# Patient Record
Sex: FEMALE | Race: WHITE
[De-identification: names, ages, dates, MRNs, and addresses within clinical notes are randomized per-mention and may not be internally consistent; named-entity substitution may affect disease eponyms.]

---

## 2019-09-12 ENCOUNTER — HOSPITAL ENCOUNTER (OUTPATIENT)
Dept: HOSPITAL 92 - BICULT | Age: 19
Discharge: HOME | End: 2019-09-12
Attending: ADVANCED PRACTICE MIDWIFE
Payer: COMMERCIAL

## 2019-09-12 DIAGNOSIS — R31.9: Primary | ICD-10-CM

## 2019-09-12 DIAGNOSIS — N13.30: ICD-10-CM

## 2019-09-12 PROCEDURE — 76770 US EXAM ABDO BACK WALL COMP: CPT

## 2019-09-12 NOTE — ULT
Bilateral renal ultrasound



CLINICAL INDICATION:

Patient with right flank pain for one week and hematuria. Patient is 6 1/2 months pregnant.



COMPARISON:

None



FINDINGS:



Right kidney: There is moderate right hydronephrosis of uncertain etiology based on this examination.
 No renal mass or renal calculus is identified on the right.The right kidney measures 10.4 cm x 6.1

cm.



Left kidney: There is no evidence of a renal mass, renal calculus, or hydronephrosis. The left kidney
 measures 10.8 cm x 4.9 cm.



Urinary bladder: Partially distended and grossly normal in appearance. The left ureteral jet is visua
lized. Ultrasound technician questions trace right ureteral jet visualized, but this is difficult

to definitely discern on this exam.



IMPRESSION:



1. Moderate right hydronephrosis, and the degree of hydronephrosis is more than typically expected fo
r an intrauterine gestation. The exact etiology is uncertain.

2. Normal-appearing left kidney without hydronephrosis.

3. Findings discussed with Dr. Khan on 9/12/2019 at 1549 hours.



Reported By: Ezequiel Machado 

Electronically Signed:  9/12/2019 3:52 PM

## 2019-09-29 ENCOUNTER — HOSPITAL ENCOUNTER (INPATIENT)
Dept: HOSPITAL 92 - ERS | Age: 19
LOS: 1 days | Discharge: HOME | DRG: 776 | End: 2019-09-30
Attending: OBSTETRICS & GYNECOLOGY | Admitting: OBSTETRICS & GYNECOLOGY
Payer: COMMERCIAL

## 2019-09-29 VITALS — BODY MASS INDEX: 25.3 KG/M2

## 2019-09-29 DIAGNOSIS — N36.8: ICD-10-CM

## 2019-09-29 DIAGNOSIS — N13.30: ICD-10-CM

## 2019-09-29 DIAGNOSIS — Z3A.28: ICD-10-CM

## 2019-09-29 DIAGNOSIS — O99.89: Primary | ICD-10-CM

## 2019-09-29 LAB
ALBUMIN SERPL BCG-MCNC: 3.7 G/DL (ref 3.5–5)
ALP SERPL-CCNC: 100 U/L (ref 40–100)
ALT SERPL W P-5'-P-CCNC: 11 U/L (ref 8–55)
ANION GAP SERPL CALC-SCNC: 11 MMOL/L (ref 10–20)
APTT PPP: 27.2 SEC (ref 22.9–36.1)
AST SERPL-CCNC: 11 U/L (ref 5–34)
BASOPHILS # BLD AUTO: 0 THOU/UL (ref 0–0.2)
BASOPHILS NFR BLD AUTO: 0.1 % (ref 0–1)
BILIRUB SERPL-MCNC: 0.2 MG/DL (ref 0.2–1.2)
BUN SERPL-MCNC: 7 MG/DL (ref 7–18.7)
CALCIUM SERPL-MCNC: 8.6 MG/DL (ref 7.8–10.44)
CHLORIDE SERPL-SCNC: 105 MMOL/L (ref 98–107)
CO2 SERPL-SCNC: 21 MMOL/L (ref 22–29)
CREAT CL PREDICTED SERPL C-G-VRATE: 0 ML/MIN (ref 70–130)
EOSINOPHIL # BLD AUTO: 0.3 THOU/UL (ref 0–0.7)
EOSINOPHIL NFR BLD AUTO: 1.8 % (ref 0–10)
GLOBULIN SER CALC-MCNC: 3 G/DL (ref 2.4–3.5)
GLUCOSE SERPL-MCNC: 78 MG/DL (ref 70–105)
HGB BLD-MCNC: 11.1 G/DL (ref 12–16)
INR PPP: 1
LIPASE SERPL-CCNC: 23 U/L (ref 8–78)
LYMPHOCYTES # BLD: 2.7 THOU/UL (ref 1.2–3.4)
LYMPHOCYTES NFR BLD AUTO: 18.7 % (ref 28–48)
MCH RBC QN AUTO: 30.1 PG (ref 25–35)
MCV RBC AUTO: 89.7 FL (ref 78–98)
MONOCYTES # BLD AUTO: 1.1 THOU/UL (ref 0.11–0.59)
MONOCYTES NFR BLD AUTO: 7.4 % (ref 0–4)
NEUTROPHILS # BLD AUTO: 10.3 THOU/UL (ref 1.4–6.5)
NEUTROPHILS NFR BLD AUTO: 71.9 % (ref 31–61)
PLATELET # BLD AUTO: 243 THOU/UL (ref 130–400)
POTASSIUM SERPL-SCNC: 3.7 MMOL/L (ref 3.5–5.1)
PROT UR STRIP.AUTO-MCNC: 30 MG/DL
PROTHROMBIN TIME: 13.3 SEC (ref 12–14.7)
RBC # BLD AUTO: 3.69 MILL/UL (ref 4–5.2)
RBC UR QL AUTO: (no result) HPF (ref 0–3)
SODIUM SERPL-SCNC: 133 MMOL/L (ref 136–145)
WBC # BLD AUTO: 14.3 THOU/UL (ref 4.8–10.8)
WBC UR QL AUTO: (no result) HPF (ref 0–3)

## 2019-09-29 PROCEDURE — 36415 COLL VENOUS BLD VENIPUNCTURE: CPT

## 2019-09-29 PROCEDURE — 96361 HYDRATE IV INFUSION ADD-ON: CPT

## 2019-09-29 PROCEDURE — 85730 THROMBOPLASTIN TIME PARTIAL: CPT

## 2019-09-29 PROCEDURE — 50432 PLMT NEPHROSTOMY CATHETER: CPT

## 2019-09-29 PROCEDURE — 85025 COMPLETE CBC W/AUTO DIFF WBC: CPT

## 2019-09-29 PROCEDURE — 81003 URINALYSIS AUTO W/O SCOPE: CPT

## 2019-09-29 PROCEDURE — 85610 PROTHROMBIN TIME: CPT

## 2019-09-29 PROCEDURE — 96374 THER/PROPH/DIAG INJ IV PUSH: CPT

## 2019-09-29 PROCEDURE — 86901 BLOOD TYPING SEROLOGIC RH(D): CPT

## 2019-09-29 PROCEDURE — 86900 BLOOD TYPING SEROLOGIC ABO: CPT

## 2019-09-29 PROCEDURE — 81015 MICROSCOPIC EXAM OF URINE: CPT

## 2019-09-29 PROCEDURE — 83690 ASSAY OF LIPASE: CPT

## 2019-09-29 PROCEDURE — 87086 URINE CULTURE/COLONY COUNT: CPT

## 2019-09-29 PROCEDURE — 80053 COMPREHEN METABOLIC PANEL: CPT

## 2019-09-29 PROCEDURE — 50430 NJX PX NFROSGRM &/URTRGRM: CPT

## 2019-09-29 NOTE — HP
TIME OF EVALUATION:  2145 hours until 2200 hours.



LOCATION:  ER.



BED:  7.



REASON FOR EVALUATION:  

1. The patient with known right hydronephrosis due to ureteral obstruction from

dextrorotation of the uterus. 

2. The patient of Lauryn Erin.



HISTORY OF PRESENT ILLNESS:  This is a 19-year-old  G2, P1, at 28 weeks and

2 days with a stated EDC of 12/20.  She states that she was having some increase in

lower back discomfort, but denies any fever.  She saw Dr. Robert Askew with Urology

this last Thursday, who recommended a percutaneous nephrostomy tube.  She has had no

prenatal complications at this time.  She has good fetal movement.  She just took

her 1 hour glucose tolerance test earlier last week, but she does not have the

results of that. 



REVIEW OF SYSTEMS:  Complete review of systems was checked and is otherwise negative

unless specified in the HPI. 



PAST MEDICAL HISTORY:  Negative.



PAST SURGICAL HISTORY:  Includes;

1. A cheek biopsy (oral biopsy) as a child, which was benign.

2. She also had a history of a stomach "biopsy" at age 13, which was benign.



ALLERGIES:  NONE.



OB HISTORY:  She had a vaginal birth 18 months ago.



SOCIAL HISTORY:  Otherwise negative.



PHYSICAL EXAMINATION:

VITAL SIGNS:  Stable.  She is afebrile.  Pulse is in the 80s to 90s, blood pressure

is in the 100s/60s. 

GENERAL:  Clinically, she is in no acute distress. 

ABDOMEN:  Soft and nontender and size consistent with dates.  No evidence of

costovertebral angle tenderness by initial ED exam confirmed by me. 

PELVIC:  No evidence of vaginal bleeding grossly, although pelvic exam was deferred. 

FETAL MONITOR:  Fetal heart tones were checked by Doppler and were within normal

limits. 



LABORATORY ASSESSMENT:  The patient has a white blood cell count of 14.3, PT and PTT

that were normal, and a creatinine that was 0.53 by serum, AST and ALT were also

negative.  She did have a UA sent, which just shows some trace leukocyte esterase,

some small blood, and no urine bacteria seen. 



ASSESSMENT:  This is a multigravida at 28 weeks and 2 days.  The patient of Lauryn Khan with known right ureteral obstruction due to dextrorotation of the uterus.  No

evidence of pyelonephritis at this time.  I have notified Dr. Robert Askew via text

and have communicated with him.  He will see her tomorrow.  The plan is to do

Interventional Radiology with a percutaneous tube tomorrow.  I will keep her n.p.o. 



PLAN:  

1. Admit to the hospital for procedure tomorrow.

2. Urology to see patient tomorrow.

3. Interventional Radiology procedure sometime tomorrow.

4. No evidence of OB complication at this time.

5. No evidence of pyelonephritis at this time.







Job ID:  [id number]

## 2019-09-30 VITALS — SYSTOLIC BLOOD PRESSURE: 111 MMHG | TEMPERATURE: 98.3 F | DIASTOLIC BLOOD PRESSURE: 63 MMHG

## 2019-09-30 PROCEDURE — 0T9330Z DRAINAGE OF RIGHT KIDNEY PELVIS WITH DRAINAGE DEVICE, PERCUTANEOUS APPROACH: ICD-10-PCS | Performed by: RADIOLOGY

## 2019-09-30 NOTE — PDOC.EVN
Event Note





- Event Note


Event Note: 





HD2





28 weeks 3 days





I walked into the room to see her at 0545 but patient was asleep (did not feel 

it needed to wake her).





I asked the RN about her status:





Patient has been stable, and resting through the night





Vitals reviewed by Michele CLINE





No evidence PTL or pyelo





A/P: Right ureteral obstruction from uterine position, pending IR perc tube 

sometime today. Dr Copeland to see today.

## 2019-09-30 NOTE — SPC
EXAM:

SPC NEPHROS CATH NEW ACCESS



PROVIDED CLINICAL HISTORY:

Patient with intrauterine gestation and right renal colic. Patient has moderate right hydronephrosis 
with worsening pain. Percutaneous right nephrostomy tube was requested.



COMPARISON:

Renal ultrasound 9/12/2019.



FINDINGS:

The procedure including the risks and complications were explained to the patient, and informed conse
nt was obtained. Patient was placed on the fluoroscopic table in the left lateral decubitus

position as the patient was unable to be placed in a prone or oblique position due to intrauterine ge
station. Limited sonographic evaluation of the right kidney was performed again demonstrating

moderate hydronephrosis. An area was marked overlying a posterior calyx midportion right kidney. The 
area was meticulously prepped and draped in the usual sterile fashion.



The skin and subcutaneous tissues were infiltrated with 1% lidocaine for local anesthesia. Utilizing 
concurrent real time ultrasound guidance, the posterior calyx midportion right kidney was accessed

utilizing micropuncture technique. There was a return of clear urine. A 0.018 inch guidewire was plac
ed, but there was difficulty in placement of the guidewire utilizing sonography. As result, a small

amount of contrast was injected followed by fluoroscopy. The guidewire was coiled in the renal pelvis
. The needle was exchanged for a 5 Sinhala introducer sheath. A 0.035 inch Amplatz guidewire was

coiled within the renal pelvis.



Introducer sheath was exchanged for an 8 Sinhala tissue dilator followed by placement of an 8 Sinhala n
ephrostomy tube. The distal portion of the nephrostomy tube was positioned in the renal pelvis.

Final fluoroscopic image was obtained. The catheter was placed to gravity drainage and sutured in steph
ce utilizing 2-0 Ethilon suture material.



The patient tolerated the procedure well and without immediate complication. Cardiac Doppler was perf
ormed of the fetus before and after the procedure, and a fetal heart rate of 150 bpm was obtained

preprocedure as well as postprocedure.



Fluoroscopy:

Total fluoroscopy time-1.7 minutes

Total dose 14,685 mGy centimeter squared



IMPRESSION:



1. Moderate right hydronephrosis.

2. Technically successful right nephrostomy tube placement.



Reported By: Ezequiel Machado 

Electronically Signed:  9/30/2019 3:25 PM

## 2019-10-01 NOTE — DIS
DATE OF ADMISSION:  09/29/2019



DATE OF DISCHARGE:  09/30/2019



TIME OF SERVICE:  1630 hours.



ADMITTING DIAGNOSIS:  Right hydronephrosis of pregnancy.



IN-HOSPITAL PROCEDURES:  Right percutaneous nephrostomy.



SUMMARY OF HOSPITAL COURSE:  The patient is a 19-year-old, G2, P1, at 28 weeks and 3

days with EDC of 1220.  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

near-total right ureteral obstruction during pregnancy.  She presented with

symptomatology, and decision was made to proceed with percutaneous nephrostomy on

the right.  She underwent percutaneous nephrostomy by Dr. Ezequiel Machado on 09/30

without difficulty.  Postoperatively, the patient tolerated the pain well and was

tolerating p.o.  She will be discharged home on Macrodantin 50 p.o. at bedtime as

per Dr. Askew to prevent UTI as well as Norco #20.  She will have follow up with

both Dr. Askew and Lauryn Khan within the next 1 to 2 weeks.  The patient has

emergency room precautions. 







Job ID:  [id number]

## 2019-10-01 NOTE — CON
DATE OF CONSULTATION:  2019



CONSULTING PHYSICIAN:  Dr. Fuentes consulted Dr. Askew.



REASON FOR CONSULTATION:  Hydronephrosis.



HISTORY OF PRESENT ILLNESS:  Mrs. Garcia is a 19-year-old white female, currently

G2, P1, 28 weeks, who has a history of hydronephrosis on the right side.  This had

occurred previously with her last pregnancy and after she delivered, she underwent a

CT scan, which demonstrated no etiology of the hydronephrosis and that was most

likely secondary to uterine positioning.  The problem started again with an

ultrasound demonstrating a recurrent hydronephrosis on the right with some

discomfort and flank pain.  She had seen me as an outpatient and we had scheduled

her for an elective nephrostomy tube placement.  Unfortunately, she came in to the

emergency room last night with severe right flank pain with nausea and vomiting, but

no white count or fevers.  She had no evidence of urinary tract infection.  Due to

the vomiting and pain, she was admitted to the hospital and I have been asked to see

her, otherwise.  She states that she is feeling a little bit better after receiving

pain medication and IV fluids.  She has good fetal movements.  She has been admitted

by the obstetric service and she does not have any concerning findings from a

 or gestational standpoint. 



PAST MEDICAL HISTORY:  None.



PAST SURGICAL HISTORY:  

1. Cheek biopsy.

2. Stomach biopsy.



ALLERGIES:  NONE.



HOME MEDICATIONS:  Multivitamin.



FAMILY HISTORY:  Noncontributory.



SOCIAL HISTORY:  Negative for tobacco, alcohol, or illicit drugs.



REVIEW OF SYSTEMS:  A 12-point review of systems is reviewed and otherwise negative

other than what was commented on the HPI. 



PHYSICAL EXAMINATION:

VITAL SIGNS:  Stable.  The patient is afebrile. 

GENERAL:  No apparent distress, communicative and alert. 

CARDIOVASCULAR:  Regular rate and rhythm.  Normal S1 and S2.  Symmetric pulses. 

HEENT:  Normocephalic, atraumatic.  Pupils are symmetric and round.  Trachea,

midline.  Moist mucous membranes. 

CHEST:  No increased work of breathing.  Symmetric expansion. 

LUNGS:  Clear anteriorly. 

ABDOMEN:  Soft, nontender.  Gravid uterus.  No rebound or guarding. 

BACK:  Right CVA tenderness.  No left CVA tenderness. 

GENITOURINARY:  Deferred at this time. 

EXTREMITIES:  Trace edema.  No clubbing or cyanosis. 

MUSCULOSKELETAL:  No joint deformity or joint erythema noted.  Full range of motion. 

SKIN:  Warm and dry.  Good turgor.  No rashes or lesions. 

NEUROLOGIC:  Cranial nerves 2 through 12 are grossly intact.  No focal sensory or

motor deficits identified. 

PSYCHIATRIC:  Alert and oriented x3.  Appropriate mood and affect.



LABORATORY EVALUATION:  A full set of labs is in the "[employer] system, which I have

reviewed.  Of note, the patient's white count is 14.3 with a creatinine of 0.53.

Urinalysis demonstrates 7 to 10 rbc's, 4-6 wbc's, no bacteria, leukocyte esterase

trace, nitrite negative.  Urine culture collected at admission is no growth at 12

hours, although culture is proceeding. 



ASSESSMENT AND PLAN:  A 19-year-old white female with right-sided hydronephrosis,

most likely secondary to uterine positioning with now uncontrolled flank pain with

vomiting.  We had already planned for outpatient nephrostomy tube to deal with

chronic pain and prevent renal deterioration.  Now that she has been admitted with

symptoms, we will plan for a nephrostomy tube placement on this admission.  She is

already n.p.o.  She has already received a dose of Rocephin.  I have spoken with Dr. Ferrera, who is currently covering for Dr. Fuentes for the morning.  After she gets her

nephrostomy tube placed, she will need to start nitrofurantoin prophylaxis at 50 mg

once a day.  She can be discharged if she is stable after the procedure, and I will

follow up in an outpatient basis.  She will need to have a nephrostomy tube changed

at least once during the duration of the remainder of her pregnancy to avoid

encrustation secondary to hypercalciuria of pregnancy.  Once she has delivered, we

will plan for removal of nephrostomy tube with another CT scan to ensure that there

are no stones or other underlying etiologies.  If none are found, then again is most

likely due to uterine positioning. 







Job ID:  [id number]

## 2019-10-05 ENCOUNTER — HOSPITAL ENCOUNTER (EMERGENCY)
Dept: HOSPITAL 92 - ERS | Age: 19
Discharge: HOME | End: 2019-10-05
Payer: COMMERCIAL

## 2019-10-05 DIAGNOSIS — Z3A.29: ICD-10-CM

## 2019-10-05 DIAGNOSIS — T83.84XA: ICD-10-CM

## 2019-10-05 DIAGNOSIS — O9A.213: Primary | ICD-10-CM

## 2019-10-05 LAB
ALBUMIN SERPL BCG-MCNC: 3.9 G/DL (ref 3.5–5)
ALP SERPL-CCNC: 116 U/L (ref 40–100)
ALT SERPL W P-5'-P-CCNC: 12 U/L (ref 8–55)
ANION GAP SERPL CALC-SCNC: 12 MMOL/L (ref 10–20)
AST SERPL-CCNC: 13 U/L (ref 5–34)
BACTERIA UR QL AUTO: (no result) HPF
BASOPHILS # BLD AUTO: 0 THOU/UL (ref 0–0.2)
BASOPHILS NFR BLD AUTO: 0.3 % (ref 0–1)
BILIRUB SERPL-MCNC: (no result) MG/DL (ref 0.2–1.2)
BUN SERPL-MCNC: 7 MG/DL (ref 7–18.7)
CALCIUM SERPL-MCNC: 9.6 MG/DL (ref 7.8–10.44)
CHLORIDE SERPL-SCNC: 104 MMOL/L (ref 98–107)
CO2 SERPL-SCNC: 24 MMOL/L (ref 22–29)
CREAT CL PREDICTED SERPL C-G-VRATE: 0 ML/MIN (ref 70–130)
EOSINOPHIL # BLD AUTO: 0.2 THOU/UL (ref 0–0.7)
EOSINOPHIL NFR BLD AUTO: 1.2 % (ref 0–10)
GLOBULIN SER CALC-MCNC: 3.4 G/DL (ref 2.4–3.5)
GLUCOSE SERPL-MCNC: 72 MG/DL (ref 70–105)
HGB BLD-MCNC: 12 G/DL (ref 12–16)
LEUKOCYTE ESTERASE UR QL STRIP.AUTO: 250 LEU/UL
LYMPHOCYTES # BLD: 2.2 THOU/UL (ref 1.2–3.4)
LYMPHOCYTES NFR BLD AUTO: 14.4 % (ref 28–48)
MCH RBC QN AUTO: 30.2 PG (ref 25–35)
MCV RBC AUTO: 89.6 FL (ref 78–98)
MONOCYTES # BLD AUTO: 1.1 THOU/UL (ref 0.11–0.59)
MONOCYTES NFR BLD AUTO: 7.5 % (ref 0–4)
NEUTROPHILS # BLD AUTO: 11.5 THOU/UL (ref 1.4–6.5)
NEUTROPHILS NFR BLD AUTO: 76.7 % (ref 31–61)
PLATELET # BLD AUTO: 251 THOU/UL (ref 130–400)
POTASSIUM SERPL-SCNC: 3.8 MMOL/L (ref 3.5–5.1)
PROT UR STRIP.AUTO-MCNC: 30 MG/DL
RBC # BLD AUTO: 3.96 MILL/UL (ref 4–5.2)
SODIUM SERPL-SCNC: 136 MMOL/L (ref 136–145)
WBC # BLD AUTO: 15 THOU/UL (ref 4.8–10.8)
WBC UR QL AUTO: (no result) HPF (ref 0–3)

## 2019-10-05 PROCEDURE — 76770 US EXAM ABDO BACK WALL COMP: CPT

## 2019-10-05 PROCEDURE — 80053 COMPREHEN METABOLIC PANEL: CPT

## 2019-10-05 PROCEDURE — 83605 ASSAY OF LACTIC ACID: CPT

## 2019-10-05 PROCEDURE — 87086 URINE CULTURE/COLONY COUNT: CPT

## 2019-10-05 PROCEDURE — 81015 MICROSCOPIC EXAM OF URINE: CPT

## 2019-10-05 PROCEDURE — 85025 COMPLETE CBC W/AUTO DIFF WBC: CPT

## 2019-10-05 PROCEDURE — 36415 COLL VENOUS BLD VENIPUNCTURE: CPT

## 2019-10-05 PROCEDURE — 87186 SC STD MICRODIL/AGAR DIL: CPT

## 2019-10-05 PROCEDURE — 81003 URINALYSIS AUTO W/O SCOPE: CPT

## 2019-10-05 PROCEDURE — 87077 CULTURE AEROBIC IDENTIFY: CPT

## 2019-10-05 NOTE — ULT
RENAL ULTRASOUND:

10/5/19

 

INDICATION:

Nephrostomy tube pain.

 

Reference is made to 9/12/19 exam.

 

FINDINGS: 

Evaluation of each kidney reveals no evidence of hydronephrosis. There has been resolution of prior r
ight side hydronephrosis. No perinephric fluid of significance is seen. 

 

There is documentation of fetal cardiac activity at 157 beats per minute. 

 

IMPRESSION: 

No overt hydronephrosis of either kidney. 

 

POS: NWK

## 2019-10-09 ENCOUNTER — HOSPITAL ENCOUNTER (OUTPATIENT)
Dept: HOSPITAL 92 - SPEC | Age: 19
Discharge: HOME | End: 2019-10-09
Attending: UROLOGY
Payer: COMMERCIAL

## 2019-10-09 VITALS — SYSTOLIC BLOOD PRESSURE: 101 MMHG | DIASTOLIC BLOOD PRESSURE: 71 MMHG | TEMPERATURE: 97.7 F

## 2019-10-09 DIAGNOSIS — Z3A.30: ICD-10-CM

## 2019-10-09 DIAGNOSIS — O99.89: Primary | ICD-10-CM

## 2019-10-09 DIAGNOSIS — N13.30: ICD-10-CM

## 2019-10-09 PROCEDURE — 50431 NJX PX NFROSGRM &/URTRGRM: CPT

## 2019-10-09 NOTE — SPC
Exam: Nephrostomy tube check



HISTORY: Right-sided hydronephrosis. Patient is 30 weeks pregnant

Exposure 1.2 minutes; 8586 mg/sq cm



FINDINGS: Successful right nephrostomy tube check. After using Amplatz wire twice, the nephrostomy tu
be did spontaneously draining urine. Contrast was administered and demonstrated that the pigtail is

within the renal pelvis. There is dilatation of the calyx and ureter.



IMPRESSION: Appropriate positioning of a right nephrostomy. Nephrostomy tube is patent.



Reported By: Annette Escobedo 

Electronically Signed:  10/9/2019 2:10 PM

## 2019-10-10 ENCOUNTER — HOSPITAL ENCOUNTER (INPATIENT)
Dept: HOSPITAL 92 - ERS | Age: 19
LOS: 6 days | Discharge: HOME HEALTH | DRG: 832 | End: 2019-10-16
Attending: OBSTETRICS & GYNECOLOGY | Admitting: OBSTETRICS & GYNECOLOGY
Payer: COMMERCIAL

## 2019-10-10 VITALS — BODY MASS INDEX: 25.4 KG/M2

## 2019-10-10 DIAGNOSIS — N13.6: ICD-10-CM

## 2019-10-10 DIAGNOSIS — O23.03: ICD-10-CM

## 2019-10-10 DIAGNOSIS — O98.813: Primary | ICD-10-CM

## 2019-10-10 DIAGNOSIS — T83.512A: ICD-10-CM

## 2019-10-10 DIAGNOSIS — R78.81: ICD-10-CM

## 2019-10-10 DIAGNOSIS — Z3A.30: ICD-10-CM

## 2019-10-10 LAB
ALBUMIN SERPL BCG-MCNC: 3.8 G/DL (ref 3.5–5)
ALP SERPL-CCNC: 179 U/L (ref 40–100)
ALT SERPL W P-5'-P-CCNC: 12 U/L (ref 8–55)
ANION GAP SERPL CALC-SCNC: 12 MMOL/L (ref 10–20)
AST SERPL-CCNC: 13 U/L (ref 5–34)
BACTERIA UR QL AUTO: (no result) HPF
BASOPHILS # BLD AUTO: 0 THOU/UL (ref 0–0.2)
BASOPHILS NFR BLD AUTO: 0.2 % (ref 0–1)
BILIRUB SERPL-MCNC: 0.4 MG/DL (ref 0.2–1.2)
BUN SERPL-MCNC: 7 MG/DL (ref 7–18.7)
CALCIUM SERPL-MCNC: 9.2 MG/DL (ref 7.8–10.44)
CHLORIDE SERPL-SCNC: 101 MMOL/L (ref 98–107)
CO2 SERPL-SCNC: 22 MMOL/L (ref 22–29)
CREAT CL PREDICTED SERPL C-G-VRATE: 0 ML/MIN (ref 70–130)
EOSINOPHIL # BLD AUTO: 0.1 THOU/UL (ref 0–0.7)
EOSINOPHIL NFR BLD AUTO: 0.4 % (ref 0–10)
GLOBULIN SER CALC-MCNC: 3.7 G/DL (ref 2.4–3.5)
GLUCOSE SERPL-MCNC: 91 MG/DL (ref 70–105)
HGB BLD-MCNC: 11.6 G/DL (ref 12–16)
LEUKOCYTE ESTERASE UR QL STRIP.AUTO: 500 LEU/UL
LYMPHOCYTES # BLD: 1.5 THOU/UL (ref 1.2–3.4)
LYMPHOCYTES NFR BLD AUTO: 10.4 % (ref 28–48)
MCH RBC QN AUTO: 29.6 PG (ref 25–35)
MCV RBC AUTO: 86 FL (ref 78–98)
MONOCYTES # BLD AUTO: 1.4 THOU/UL (ref 0.11–0.59)
MONOCYTES NFR BLD AUTO: 9.7 % (ref 0–4)
NEUTROPHILS # BLD AUTO: 11.1 THOU/UL (ref 1.4–6.5)
NEUTROPHILS NFR BLD AUTO: 79.4 % (ref 31–61)
PLATELET # BLD AUTO: 239 THOU/UL (ref 130–400)
POTASSIUM SERPL-SCNC: 3.3 MMOL/L (ref 3.5–5.1)
PROT UR STRIP.AUTO-MCNC: 70 MG/DL
RBC # BLD AUTO: 3.92 MILL/UL (ref 4–5.2)
RBC UR QL AUTO: (no result) HPF (ref 0–3)
SODIUM SERPL-SCNC: 132 MMOL/L (ref 136–145)
WBC # BLD AUTO: 14 THOU/UL (ref 4.8–10.8)
WBC UR QL AUTO: (no result) HPF (ref 0–3)

## 2019-10-10 PROCEDURE — 59025 FETAL NON-STRESS TEST: CPT

## 2019-10-10 PROCEDURE — 96375 TX/PRO/DX INJ NEW DRUG ADDON: CPT

## 2019-10-10 PROCEDURE — 87149 DNA/RNA DIRECT PROBE: CPT

## 2019-10-10 PROCEDURE — 36415 COLL VENOUS BLD VENIPUNCTURE: CPT

## 2019-10-10 PROCEDURE — 80048 BASIC METABOLIC PNL TOTAL CA: CPT

## 2019-10-10 PROCEDURE — 85025 COMPLETE CBC W/AUTO DIFF WBC: CPT

## 2019-10-10 PROCEDURE — 87040 BLOOD CULTURE FOR BACTERIA: CPT

## 2019-10-10 PROCEDURE — C1729 CATH, DRAINAGE: HCPCS

## 2019-10-10 PROCEDURE — 96361 HYDRATE IV INFUSION ADD-ON: CPT

## 2019-10-10 PROCEDURE — 87086 URINE CULTURE/COLONY COUNT: CPT

## 2019-10-10 PROCEDURE — 76770 US EXAM ABDO BACK WALL COMP: CPT

## 2019-10-10 PROCEDURE — 80053 COMPREHEN METABOLIC PANEL: CPT

## 2019-10-10 PROCEDURE — 81015 MICROSCOPIC EXAM OF URINE: CPT

## 2019-10-10 PROCEDURE — 96365 THER/PROPH/DIAG IV INF INIT: CPT

## 2019-10-10 PROCEDURE — 81003 URINALYSIS AUTO W/O SCOPE: CPT

## 2019-10-10 PROCEDURE — 87186 SC STD MICRODIL/AGAR DIL: CPT

## 2019-10-10 PROCEDURE — 87077 CULTURE AEROBIC IDENTIFY: CPT

## 2019-10-10 PROCEDURE — 75984 XRAY CONTROL CATHETER CHANGE: CPT

## 2019-10-10 PROCEDURE — C1769 GUIDE WIRE: HCPCS

## 2019-10-10 PROCEDURE — 50436 DILAT XST TRC NDURLGC PX: CPT

## 2019-10-10 PROCEDURE — 83605 ASSAY OF LACTIC ACID: CPT

## 2019-10-10 PROCEDURE — 87804 INFLUENZA ASSAY W/OPTIC: CPT

## 2019-10-10 PROCEDURE — 50431 NJX PX NFROSGRM &/URTRGRM: CPT

## 2019-10-10 PROCEDURE — 93005 ELECTROCARDIOGRAM TRACING: CPT

## 2019-10-11 RX ADMIN — MEROPENEM AND SODIUM CHLORIDE SCH MLS: 1 INJECTION, SOLUTION INTRAVENOUS at 13:35

## 2019-10-11 RX ADMIN — CEFTRIAXONE SCH MLS: 1 INJECTION, POWDER, FOR SOLUTION INTRAMUSCULAR; INTRAVENOUS at 21:01

## 2019-10-11 RX ADMIN — MEROPENEM AND SODIUM CHLORIDE SCH MLS: 1 INJECTION, SOLUTION INTRAVENOUS at 21:50

## 2019-10-11 NOTE — HP
TIME OF SERVICE:  636.



REASON FOR ADMISSION:  Pyelonephritis, right-sided with sepsis at 30 weeks

gestation, status post right percutaneous nephrostomy with multidrug resistant

Pseudomonas. 



HISTORY OF PRESENT ILLNESS:  Ms. Garcia is well known to the service.  She is a

19-year-old, G2, P1, EDC , at 30 weeks gestation, who had percutaneous

nephrostomies placed approximately a week ago.  She was put on Macrodantin

suppression.  However, urine has grown Pseudomonas on 10/05 that was sensitive to

cephalosporins as well as meropenem.  She presented to the emergency room with fever

and tachycardia and is admitted for care by the OB service because of her pregnancy

with consultation of Dr. Askew, Urology. 



OB/GYN HISTORY:  Prior .  During that pregnancy, she had right hydronephrosis and

ureteral obstruction from dextrorotation of the uterus as well. 



PAST MEDICAL HISTORY:  Negative.



PAST SURGICAL HISTORY:  Percutaneous nephrostomy, cheek biopsy, stomach biopsy.



ALLERGIES:  DENIES.



MEDICATIONS:  Prenatal vitamins, Macrobid.



SOCIAL HISTORY:  Denies tobacco, alcohol, or IV drug use.



FAMILY HISTORY:  Noncontributory.



REVIEW OF SYSTEMS:  Noncontributory .



PHYSICAL EXAMINATION:

VITAL SIGNS:  On presentation to the emergency room, the patient's temperature was

100.4, current temperature is 99.0, pulse 105, blood pressure 104/58. 

HEENT:  Within normal limits. 

LUNGS:  Clear to auscultation bilaterally. 

HEART:  Regular rate and rhythm. 

ABDOMEN:  Soft and nontender.  Fundal height 31.  FHT is 160s.  Right percutaneous

nephrostomy is in place and draining well with clear urine. 

EXTREMITIES:  No clubbing, cyanosis, or edema.



LABORATORY DATA:  The patient's white count 14.0, which is actually consistent with

previous presentations.  Neutrophils are 79%, hematocrit is 33%, normal platelet

count.  Base met reveals slightly hyponatremic at 132, potassium 3.3.  Urinalysis

was turbid with 500+ leukocyte esterase and 7 to 10 WBCs per high-power field with

2+ bacteria. 



IMPRESSION:  Pseudomonas urosepsis at 30 weeks gestation, status post right

percutaneous nephrostomy. 



PLAN:  Admission, consultation with Dr. Askew, meropenem and Rocephin.  We will

correct hypokalemia. 







Job ID:  [id number]

## 2019-10-11 NOTE — CON
DATE OF CONSULTATION:  10/11/2019



CONSULTING:  Dr. Ferrera.



CONSULTED:  Dr. Askew.



REASON FOR CONSULTATION:  Complicated urinary tract infection in pregnancy.



HISTORY OF PRESENT ILLNESS:  Ms. Garcia is a 19-year-old white female, who is well

known to me from a previous admission and I am currently seeing her for this

pregnancy secondary to hydronephrosis.  She is currently G2, P1, at 30 weeks'

gestation, who had development of right-sided hydronephrosis and microhematuria.

This had occurred almost identically on her prior pregnancy with no stone

encountered.  Due to symptoms of nausea, vomiting, and persistent pain, she had a

nephrostomy tube placed on her right side.  She had issues with nephrostomy clogging

and recently had it unclogged in Interventional Radiology.  She had seen us in our

office to learn how to flush her nephrostomy tube.  She just did this yesterday and

then began having temperatures up to 101 today, so she came into the hospital where

she was admitted.  A urine culture was taken from the nephrostomy tube, which is

currently growing Pseudomonas, but susceptibilities are not yet available.  She is

currently being treated with meropenem and Rocephin and is stating that she is

feeling much better.  She is no longer having temperatures and states that her pain

is gone down significantly.  Her heart rate is also better. 



PAST MEDICAL HISTORY:  Prior hydronephrosis with pregnancy.



PAST SURGICAL HISTORY:  

1. Nephrostomy tube.

2. Cheek biopsy.

3. Stomach biopsy.



ALLERGIES:  NONE.



CURRENT MEDICATIONS:  

1. Prophylactic Macrobid once a day.

2. Prenatal vitamins.



SOCIAL HISTORY:  The patient denies tobacco, alcohol, or IV drug use.



FAMILY HISTORY:  Noncontributory.



REVIEW OF SYSTEMS:  A 12-point review of system was reviewed and negative otherwise

other than what was commented on the HPI. 



PHYSICAL EXAMINATION:

VITAL SIGNS:  Temperature 98.4, pulse 92, respirations 16, blood pressure 96/54, and

saturation 98% on room air. 

GENERAL:  No apparent distress.  Communicative and alert. 

CARDIOVASCULAR:  Regular rate and rhythm.  Normal S1 and S2.  Symmetric pulses. 

HEENT:  Normocephalic and atraumatic.  Pupils are symmetric and round.  Trachea

midline.  Moist mucous membranes. 

CHEST:  No increased work of breathing.  Symmetric expansion. 

LUNGS:  Clear anteriorly. 

ABDOMEN:  Gravid uterus.  Normal bowel sounds.  No rebound tenderness or guarding.

Soft, nontender, and nondistended. 

:  Exam is deferred at this time. 

BACK:  Nephrostomy tube currently in good location, draining clear yellow urine. 

EXTREMITIES:  Trace edema bilaterally.  No clubbing or cyanosis. 

SKIN:  Warm and dry.  No rashes or lesions.  Good turgor.  LYMPH NODES:  There is no

cervical, supraclavicular, or inguinal lymphadenopathy. 

PSYCHIATRIC:  Alert and oriented x3.  Appropriate mood and affect. 

NEUROLOGIC:  Cranial nerves 2 through 12 grossly intact.  No focal or sensory motor

deficits identified. 



LABORATORY EVALUATION:  The full set of labs are in the [employer] system, which I

have reviewed.  Of note, white count is up to 14,000, hemoglobin of 11.6.

Creatinine of 0.60.  Urinalysis demonstrates 2+ bacteria, 7 to 10 white cells, 4 to

6 red cells, and urine culture again is growing Pseudomonas.  Susceptibilities to

follow.  Blood cultures currently are negative to date. 



ASSESSMENT AND PLAN:  A 19-year-old white female with Pseudomonas urinary tract

infection.  Macrobid does not cover against Pseudomonas, which is likely why this

pathogen has started to cause a problem.  Unfortunately, the best oral antibiotic

agent for Pseudomonas is Cipro, which she cannot take currently while pregnant.  I

would recommend continuation of IV antibiotics as she is currently on until

susceptibilities are back.  If she has susceptibilities to Rocephin, she can be

discharged after the weekend and we can continue to give her ceftriaxone injections

in the office in Urology Monday through Friday to complete 7 days for complicated

urinary tract infection.  However, if there are no susceptibilities to Rocephin or

if she must remain on meropenem or other IV antibiotic, then she will either need to

consider 7-day hospital stay with IV antibiotics or insertion of a PICC line with

home IV antibiotics.  We will continue to follow along and make any recommendations

as necessary.  I will plan to see her in my office next week, after which point we

will schedule her for a routine nephrostomy tube change coming up in approximately 1

week or so. 







Job ID:  [id number]

## 2019-10-12 LAB
ANION GAP SERPL CALC-SCNC: 8 MMOL/L (ref 10–20)
BASOPHILS # BLD AUTO: 0 THOU/UL (ref 0–0.2)
BASOPHILS NFR BLD AUTO: 0.3 % (ref 0–1)
BUN SERPL-MCNC: 5 MG/DL (ref 7–18.7)
CALCIUM SERPL-MCNC: 8 MG/DL (ref 7.8–10.44)
CHLORIDE SERPL-SCNC: 109 MMOL/L (ref 98–107)
CO2 SERPL-SCNC: 21 MMOL/L (ref 22–29)
CREAT CL PREDICTED SERPL C-G-VRATE: 205 ML/MIN (ref 70–130)
EOSINOPHIL # BLD AUTO: 0.2 THOU/UL (ref 0–0.7)
EOSINOPHIL NFR BLD AUTO: 1.5 % (ref 0–10)
GLUCOSE SERPL-MCNC: 83 MG/DL (ref 70–105)
HGB BLD-MCNC: 9.7 G/DL (ref 12–16)
LYMPHOCYTES # BLD: 2.2 THOU/UL (ref 1.2–3.4)
LYMPHOCYTES NFR BLD AUTO: 20.9 % (ref 28–48)
MCH RBC QN AUTO: 30.3 PG (ref 25–35)
MCV RBC AUTO: 89.7 FL (ref 78–98)
MONOCYTES # BLD AUTO: 1.1 THOU/UL (ref 0.11–0.59)
MONOCYTES NFR BLD AUTO: 10.5 % (ref 0–4)
NEUTROPHILS # BLD AUTO: 7.2 THOU/UL (ref 1.4–6.5)
NEUTROPHILS NFR BLD AUTO: 66.8 % (ref 31–61)
PLATELET # BLD AUTO: 217 THOU/UL (ref 130–400)
POTASSIUM SERPL-SCNC: 3.9 MMOL/L (ref 3.5–5.1)
RBC # BLD AUTO: 3.21 MILL/UL (ref 4–5.2)
SODIUM SERPL-SCNC: 134 MMOL/L (ref 136–145)
WBC # BLD AUTO: 10.7 THOU/UL (ref 4.8–10.8)

## 2019-10-12 RX ADMIN — CEFTRIAXONE SCH MLS: 1 INJECTION, POWDER, FOR SOLUTION INTRAMUSCULAR; INTRAVENOUS at 20:35

## 2019-10-12 RX ADMIN — MEROPENEM AND SODIUM CHLORIDE SCH MLS: 1 INJECTION, SOLUTION INTRAVENOUS at 21:13

## 2019-10-12 RX ADMIN — MEROPENEM AND SODIUM CHLORIDE SCH MLS: 1 INJECTION, SOLUTION INTRAVENOUS at 06:33

## 2019-10-12 RX ADMIN — MEROPENEM AND SODIUM CHLORIDE SCH MLS: 1 INJECTION, SOLUTION INTRAVENOUS at 13:47

## 2019-10-12 NOTE — PRG
DATE OF SERVICE:  10/12/2019



SUBJECTIVE:  The patient is a 19-year-old female with an intrauterine pregnancy at

30 weeks and 1 day, admitted to the hospital for complications of a right

nephrostomy tube, percutaneous nephrostomy tube that she had placed for obstruction

from the pregnancy.  The patient had not been feeling well, had fever, and placed on

meropenem and Rocephin due to urine cultures positive for Pseudomonas.  The patient

since admission to the hospital, has had no recurrent febrile episodes.  Temperature

over the last 24 hours has maxed out at 99 degrees. 



OBJECTIVE:  VITAL SIGNS:  Current temperature is 97.9, blood pressure 99/58, pulse

is 75, respiratory rate of 20. 

GENERAL:  She appears to be in no acute distress.  She is alert and oriented,

cooperative, and pleasant to interact with. 

BACK:  She has no CVA tenderness. 

ABDOMEN:  No abdominal tenderness. 

EXTREMITIES:  Nontender, nonedematous.



LABORATORY DATA:  Urine cultures positive for Pseudomonas.  Blood cultures positive

for Pseudomonas pseudo aeruginosa.  Awaiting on sensitivities from these cultures.

I did run into MsGeorge Lauryn Erin, her primary OB, who reported a previous culture

recently finalized and was positive for the same sensitivity to cephalosporins,

fluoroquinolones, meropenem. 



ASSESSMENT AND PLAN:  The patient is a 19-year-old female with blood cultures and

urine cultures positive for Pseudomonas aeruginosa with sensitivity proven to be

pansensitive.  Urology has been following this patient and had recommended 1 week of

IV antibiotics with a plan for IM injections daily should she be sensitive to

Rocephin.  With cultures being positive in her blood, I am not sure how the plan has

changed for them, but we will be awaiting their recommendations.  The patient

clinically appears to be doing really well and would suspect that she can still go

home with the current plan in place.  We will be awaiting Urology's recommendations. 







Job ID:  [id number]

## 2019-10-13 RX ADMIN — MEROPENEM AND SODIUM CHLORIDE SCH MLS: 1 INJECTION, SOLUTION INTRAVENOUS at 05:37

## 2019-10-13 RX ADMIN — CEFTRIAXONE SCH MLS: 1 INJECTION, POWDER, FOR SOLUTION INTRAMUSCULAR; INTRAVENOUS at 20:29

## 2019-10-13 RX ADMIN — MEROPENEM AND SODIUM CHLORIDE SCH MLS: 1 INJECTION, SOLUTION INTRAVENOUS at 22:21

## 2019-10-13 RX ADMIN — MEROPENEM AND SODIUM CHLORIDE SCH MLS: 1 INJECTION, SOLUTION INTRAVENOUS at 13:27

## 2019-10-13 NOTE — PRG
DATE OF SERVICE:  10/13/2019



TIME OF SERVICE:  0640 hours.



SUBJECTIVE:  Ms. Garcia is resting comfortably.  She is 30 weeks' gestation with

complicated urinary tract infection and urinary sepsis with positive blood cultures

and urine cultures for Pseudomonas sensitive to meropenem and a right percutaneous

nephrostomy.  She remains afebrile and reports an active fetus.  Denies

contractions. 



PHYSICAL EXAMINATION:

LUNGS:  Clear to auscultation bilaterally. 

HEART:  Regular rate and rhythm. 

ABDOMEN:  Soft and nontender.  Percutaneous nephrostomy is draining. 

EXTREMITIES:  No clubbing, cyanosis, or edema. 

VITAL SIGNS:  Temperature 98, T-max 98.2, pulse 90, respirations 16, blood pressure

191/52. 



IMPRESSION:  Complicated urinary tract infection with urinary sepsis at 30 weeks'

gestation with percutaneous nephrostomy on the right and Pseudomonas aeruginosa

sensitive to Rocephin and meropenem, both of which the patient is on hospital day #3

of treatment. 



PLAN:  As per Dr. Askew, we will continue IV antibiotics until Monday.  Anticipate

him following up with the patient on Monday and anticipating discharge home with

daily Rocephin at his office for a total of 7 days treatment. 







Job ID:  [id number]

## 2019-10-14 RX ADMIN — MEROPENEM AND SODIUM CHLORIDE SCH MLS: 1 INJECTION, SOLUTION INTRAVENOUS at 14:51

## 2019-10-14 RX ADMIN — MEROPENEM AND SODIUM CHLORIDE SCH MLS: 1 INJECTION, SOLUTION INTRAVENOUS at 05:58

## 2019-10-14 RX ADMIN — CEFTRIAXONE SCH MLS: 1 INJECTION, POWDER, FOR SOLUTION INTRAMUSCULAR; INTRAVENOUS at 20:45

## 2019-10-14 NOTE — PDOC.EVN
Event Note





- Event Note


Event Note: 


30 3/7 weeks.


Feels well this AM.


No c/o.


VSS AF, 104/58, 78.


Nephrosomy draining.


Awaiting arrangements with Dr. Askew re; WILLIAM at home.

## 2019-10-15 NOTE — DIS
DATE OF ADMISSION:  10/11/2019



DATE OF DISCHARGE:  10/15/2019



ADMITTING DIAGNOSES:  

1. Intrauterine pregnancy at 30 weeks.

2. Pyelonephritis on right side with sepsis, status post percutaneous nephrostomy

tube with Pseudomonas. 



DISCHARGE DIAGNOSIS:  

1. Intrauterine pregnancy at 30 weeks.

2. Pyelonephritis on the right side with sepsis, status post percutaneous

nephrostomy tube with Pseudomonas. 



PROCEDURE PERFORMED:  Replacement of nephrostomy tube.



CONSULTATION:  Urology, Dr. Askew.



HOSPITAL COURSE:  The patient is a 19-year-old female with an intrauterine pregnancy

at approximately 30 weeks' gestation, complicated by obstruction of her right ureter

due to deviation of the uterus requiring a nephrostomy tube on the right side.  She

became infected with Pseudomonas and was admitted for pyelonephritis, on Rocephin

and meropenem.  Cultures returned back showing pan sensitivity with sensitivity to

Rocephin.  Urology consultation had recommended daily IM in office injections for a

week once the patient was proven sensitivity to Rocephin and otherwise in-house IV

antibiotics for a week.  With blood cultures came back positive, recommendations

have changed to 2 weeks of treatment.  During the course of stay, the patient has

remained afebrile and clinically has done very well.  She is now on hospital day 5.

Plan this morning is for her nephrostomy tube to be replaced prior to discharge.

She will get a dose of Rocephin and has an appointment tomorrow with her urologist,

where she will continue her outpatient treatment.  The patient will be getting a 

total of 2 weeks of antibiotic coverage or until her urologist discontinues

antibiotic use.  The patient is being discharged home, again has followup

appointment with her 

urologist, Dr. Askew tomorrow and will need to see Ms. Lauryn Khan in the next

week or so. 







Job ID:  [id number]

## 2019-10-15 NOTE — PRG
DATE OF SERVICE:  10/15/2019



SUBJECTIVE:  The patient states she is feeling fine.  She is having no complaints.

No significant flank pain.  Nephrostomy tube was not able to be changed today

secondary to scheduling availability.  She is currently on the schedule for tomorrow

for nephrostomy tube. 



OBJECTIVE:  VITAL SIGNS:  Temperature 97.7, pulse 81, respirations 16, blood

pressure 99/53, saturation 98% on room air. 

GENERAL:  No apparent distress.  Communicative and alert. 

CARDIOVASCULAR:  Regular rate and rhythm. 

ABDOMEN:  Gravid uterus, normal, soft, tender, nondistended.  Right nephrostomy tube

in place, draining clear yellow urine. 

EXTREMITIES:  Trace edema.



LABORATORY EVALUATION:  There are no current labs to review. 



Cultures; blood cultures are positive for Pseudomonas x2 with urine culture being

positive. 



ASSESSMENT AND PLAN:  A 19-year-old white female with bacteremia with Pseudomonas

secondary to urinary tract infection with obstructed nephrostomy tube.  The

nephrostomy tube is currently draining, but there is a high risk of biofilm.  I

would recommend early change of nephrostomy tube.  This had currently been scheduled

for today, but it is now pending for schedule for tomorrow.  She can be n.p.o. after

midnight for nephrostomy tube change tomorrow.  Continue on IV Rocephin, which will

need to continue for a total of two weeks, which will take us to Thursday of next

week.  She can do these in my office with going to the ER over the weekend with

orders to get continued Rocephin injections, after which we will probably continue

with Omnicef prophylaxis.  Ultimately, she may have to get one additional tube

change close to her term day unless she is going to deliver early, which the

decision will be made with her midwife or obstetrician.  I will continue to follow

along and make any recommendations appropriate. 





Job ID:  [id number]

## 2019-10-16 VITALS — SYSTOLIC BLOOD PRESSURE: 113 MMHG | DIASTOLIC BLOOD PRESSURE: 67 MMHG | TEMPERATURE: 98.7 F

## 2019-10-16 PROCEDURE — 0T25X0Z CHANGE DRAINAGE DEVICE IN KIDNEY, EXTERNAL APPROACH: ICD-10-PCS | Performed by: RADIOLOGY

## 2019-10-16 NOTE — DIS
DATE OF ADMISSION:  10/11/2019



DATE OF DISCHARGE:  10/16/2019



ADDENDUM:  



PRIMARY OB:  Ms. Lauryn Erin, and Dr. Robert Askew, Urology. 



The patient was initially scheduled for discharge yesterday following nephrostomy

tube replacement.  However, due to circumstances out of the patient's control,

nephrostomy tube replacement was deferred to this morning.  This is now completed

and the patient is ready for discharge.  She will continue outpatient IM injections

of Rocephin for treatment of pyelonephritis and positive blood cultures of

Pseudomonas for the next 2 weeks or as directed by her urologist.  Discharge orders

have been resumed. 







Job ID:  [id number]

## 2019-10-16 NOTE — PDOC.EVN
Event Note





- Event Note


Event Note: 





S: Patient is doing well this morning, no complaints.  Sleeping comfortably.





O: AFVSS


Gen - AAO, NAD


Abd - soft, NTTP





A/P: Patient scheduled for nephrostomy tube replacement this morning and has 

been NPO since midnight.  May be discharged home following procedure.  Will 

follow up with Dr. Askew for repeat Rocephin injections.

## 2019-10-16 NOTE — SPC
PERCUTANEOUS RIGHT NEPHROSTOMY EXCHANGE:

 

INDICATIONS:

The patient has an indwelling right percutaneous nephrostomy catheter due to right ureteral obstructi
on from pregnancy. The patient is sent for nephrostomy catheter exchange due to urinary infection.

 

FINDINGS:

A new 8 Togolese nephrostomy catheter was exchanged over a wire. The pigtail was positioned in the roland
l pelvis. Post procedure injection confirmed good position and function of the new nephrostomy cathet
er.

 

PROCEDURE IN DETAIL:

The indwelling right nephrostomy catheter is prepped and draped. Iodinated contrast was injected thro
ugh the indwelling catheter, which confirms adequate position within the renal pelvis. The renal pelv
is and right ureter opacify and there is continued evidence of right ureteral obstruction distally fr
om the pregnancy.

 

The pigtail was unlocked. An 0.035 Stiff Glidewire was introduced through the indwelling catheter. Th
e pigtail was straightened. The wire was coiled within the renal pelvis.

 

The catheter was withdrawn over the wire under fluoroscopic guidance.

 

A new 8 Togolese nephrostomy catheter was advanced over the wire under fluoroscopic observation and gm
dance. The catheter was positioned in the renal pelvis. The wire was removed. The pigtail was formed.
 Contrast was injected which confirmed good position and function of the new catheter. The pigtail wa
s locked. The catheter was secured at the skin with suture. A sterile dressing was applied and the ca
theter was attached to gravity drainage. There were no problems or complications.

 

POS: [facility]

## 2019-10-17 NOTE — PQF
[employer]      
                                      KELIN WARE MD

[id number]                                                             

[id number]                             

                                   

CLINICAL DOCUMENTATION CLARIFICATION FORM:  POST DISCHARGE



Addendum to original discharge summary date:  ______10/17_______________________
_________



Late entry note date:  _______________10/19_____________________________________
_______











DATE:   10/[id number]                                           ATTN:KELIN WARE MD



Please exercise your independent, professional judgment in responding to the 
clarification form. 

Clinical indicators are provided on the bottom of this form for your review



Please check appropriate box(s):

Conflicting documentation was noted in the Medical Record, please clarify if 
patient is being treated/monitored for:



[  ] Sepsis

[ x ] Bacteremia

[  ] Other diagnosis ___________

[  ] Unable to determine



For continuity of documentation, please document condition throughout progress 
notes and discharge summary.  Thank You.



CLINICAL INDICATORS - SIGNS / SYMPTOMS/ LABS 

- Pyelonephritis, right sided with sepsis at 30 weeks gestation-H&P, 10/11, 
Iban Kothari MD

- Pseudomonas urosepsis-H&P, 10/11, Iban Kothari MD

- WBC:14.0-H&P, 10/11, Iban Kothari MD

- Bacteremia with pseudomonas sec to UTI- Progress note, 10/15, Azar Jones MD

- Temp: 97.7, Pulse: 81, RR:16- Progress note, 10/15, Azar Jones MD



RISK FACTORS

- Intrauterine pregnancy at 30 weeks- DS, 10/15, KELIN WARE MD

- Replacement of nephrostomy tube-DS, 10/15, KELIN WARE MD



TREATMENT

-Rocephin.IV- MAR, 10/10





(This form is maintained as a part of the permanent medical record)

2015 [email or web address].  All Rights Reserved

Tejas Roca    [not provided]    [not provided]

                                                              



MTDD

## 2019-10-19 ENCOUNTER — HOSPITAL ENCOUNTER (OUTPATIENT)
Dept: HOSPITAL 92 - L&D/OP | Age: 19
Discharge: HOME | End: 2019-10-19
Attending: UROLOGY
Payer: COMMERCIAL

## 2019-10-19 DIAGNOSIS — O23.40: Primary | ICD-10-CM

## 2019-10-19 DIAGNOSIS — T83.092A: ICD-10-CM

## 2019-10-19 DIAGNOSIS — O9A.219: ICD-10-CM

## 2019-10-19 DIAGNOSIS — B96.5: ICD-10-CM

## 2019-10-19 DIAGNOSIS — Z79.899: ICD-10-CM

## 2019-10-19 DIAGNOSIS — Z3A.00: ICD-10-CM

## 2019-10-19 NOTE — EKG
Test Reason : 

Blood Pressure : ***/*** mmHG

Vent. Rate : 118 BPM     Atrial Rate : 118 BPM

   P-R Int : 132 ms          QRS Dur : 072 ms

    QT Int : 294 ms       P-R-T Axes : 036 020 011 degrees

   QTc Int : 412 ms

 

Sinus tachycardia

Otherwise normal ECG

 

Confirmed by PRAKASH PRUITT, KELIN (12),  ELIA REDDY ([id number]) on 10/19/2019 3:19:49 PM

 

Referred By:             Confirmed By:KELIN RANDALL MD

## 2019-10-20 ENCOUNTER — HOSPITAL ENCOUNTER (OUTPATIENT)
Dept: HOSPITAL 92 - L&D/OP | Age: 19
Discharge: HOME | End: 2019-10-20
Attending: UROLOGY
Payer: COMMERCIAL

## 2019-10-20 VITALS — TEMPERATURE: 98.2 F | SYSTOLIC BLOOD PRESSURE: 100 MMHG | DIASTOLIC BLOOD PRESSURE: 58 MMHG

## 2019-10-20 DIAGNOSIS — O9A.219: ICD-10-CM

## 2019-10-20 DIAGNOSIS — Z3A.00: ICD-10-CM

## 2019-10-20 DIAGNOSIS — Z79.899: ICD-10-CM

## 2019-10-20 DIAGNOSIS — T83.092A: ICD-10-CM

## 2019-10-20 DIAGNOSIS — B96.5: ICD-10-CM

## 2019-10-20 DIAGNOSIS — O23.40: Primary | ICD-10-CM

## 2019-11-01 ENCOUNTER — HOSPITAL ENCOUNTER (OUTPATIENT)
Dept: HOSPITAL 92 - SPEC | Age: 19
Discharge: HOME | End: 2019-11-01
Attending: UROLOGY
Payer: COMMERCIAL

## 2019-11-01 VITALS — SYSTOLIC BLOOD PRESSURE: 106 MMHG | DIASTOLIC BLOOD PRESSURE: 67 MMHG | TEMPERATURE: 97.5 F

## 2019-11-01 VITALS — BODY MASS INDEX: 25.7 KG/M2

## 2019-11-01 DIAGNOSIS — Z3A.33: ICD-10-CM

## 2019-11-01 DIAGNOSIS — N99.522: ICD-10-CM
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
## 2019-11-01 DIAGNOSIS — N13.30: ICD-10-CM

## 2019-11-01 DIAGNOSIS — O99.89: Primary | ICD-10-CM

## 2019-11-01 PROCEDURE — 75984 XRAY CONTROL CATHETER CHANGE: CPT

## 2019-11-01 PROCEDURE — C1729 CATH, DRAINAGE: HCPCS

## 2019-11-01 PROCEDURE — C1769 GUIDE WIRE: HCPCS

## 2019-11-01 PROCEDURE — 50431 NJX PX NFROSGRM &/URTRGRM: CPT

## 2019-11-01 PROCEDURE — 50436 DILAT XST TRC NDURLGC PX: CPT

## 2019-11-01 PROCEDURE — 0T25X0Z CHANGE DRAINAGE DEVICE IN KIDNEY, EXTERNAL APPROACH: ICD-10-PCS

## 2019-11-01 NOTE — SPC
Exam: Fluoroscopically guided nephrostomy tube exchange



HISTORY: Pregnant patient with severe right-sided hydronephrosis. Tube is not draining.

Comparison 10/16/2019

Exposure 8 minutes, 115,990 mG^cm2



FINDINGS: Successful right nephrostomy tube exchange. A new 8 Barbadian nephrostomy catheter was placed.
 Confirmation of positioning was confirmed with contrast administration



TECHNIQUE: Consent was obtained to perform an exchange of a right nephrostomy tube catheter. Multiple
 attempts were made to pass different wires through the nephrostomy tube. However, a wire would not

pass and gritty material was noted along the surface of the tube as well as within the tube at the wi
re was pulled out. Findings suggest significant debris within the catheter. The catheter was cut

and had a be removed. Through the tract, a 5 Barbadian tapered sheath was advanced, into the collecting 
system. Wire was passed through the 5 Barbadian sheath into the intrarenal collection system. The

sheath was exchanged for a 8 Barbadian nephrostomy tube. Tube was appropriately position of the coil was
 formed. Contrast was administered to document appropriate location. Catheter was sutured to the

patient. Patient tolerated the procedure well. No immediate or postprocedure complications.



Conscious sedation: 50 mcg fentanyl IV



IMPRESSION: Successful 8 Barbadian right nephrostomy tube exchange.



Transcribed Date/Time: 11/1/2019 3:33 PM



Reported By: Annette Escobedo 

Electronically Signed:  11/1/2019 3:53 PM

## 2019-11-01 NOTE — ULT
Exam: Limited ultrasound of the right kidney. Eval for fetal heart tones.



COMPARISON: none



FINDINGS: Targeted sonographic imaging of the right kidney demonstrates severe hydronephrosis

Preprocedure fetal heart tones were 118 bpm. Post procedure heart tones were 126 beats per.



IMPRESSION: Findings as above.



Reported By: Annette Escobedo 

Electronically Signed:  11/1/2019 3:12 PM

## 2019-11-20 ENCOUNTER — HOSPITAL ENCOUNTER (INPATIENT)
Dept: HOSPITAL 92 - L&D/OP | Age: 19
LOS: 3 days | Discharge: HOME | End: 2019-11-23
Attending: OBSTETRICS & GYNECOLOGY | Admitting: OBSTETRICS & GYNECOLOGY
Payer: MEDICAID

## 2019-11-20 VITALS — BODY MASS INDEX: 27.4 KG/M2

## 2019-11-20 DIAGNOSIS — Z3A.37: ICD-10-CM

## 2019-11-20 DIAGNOSIS — O99.89: Primary | ICD-10-CM

## 2019-11-20 DIAGNOSIS — N13.30: ICD-10-CM

## 2019-11-20 LAB
HBSAG INDEX: 0.17 S/CO (ref 0–0.99)
HGB BLD-MCNC: 10.8 G/DL (ref 12–16)
MCH RBC QN AUTO: 28.9 PG (ref 25–35)
MCV RBC AUTO: 87 FL (ref 78–98)
PLATELET # BLD AUTO: 302 THOU/UL (ref 130–400)
RBC # BLD AUTO: 3.75 MILL/UL (ref 4–5.2)
SYPHILIS ANTIBODY INDEX: 0.04 S/CO
WBC # BLD AUTO: 16.2 THOU/UL (ref 4.8–10.8)

## 2019-11-20 PROCEDURE — 86901 BLOOD TYPING SEROLOGIC RH(D): CPT

## 2019-11-20 PROCEDURE — 3E0P7VZ INTRODUCTION OF HORMONE INTO FEMALE REPRODUCTIVE, VIA NATURAL OR ARTIFICIAL OPENING: ICD-10-PCS | Performed by: ADVANCED PRACTICE MIDWIFE

## 2019-11-20 PROCEDURE — 85027 COMPLETE CBC AUTOMATED: CPT

## 2019-11-20 PROCEDURE — G0008 ADMIN INFLUENZA VIRUS VAC: HCPCS

## 2019-11-20 PROCEDURE — 87340 HEPATITIS B SURFACE AG IA: CPT

## 2019-11-20 PROCEDURE — 86780 TREPONEMA PALLIDUM: CPT

## 2019-11-20 PROCEDURE — 3E033VJ INTRODUCTION OF OTHER HORMONE INTO PERIPHERAL VEIN, PERCUTANEOUS APPROACH: ICD-10-PCS | Performed by: ADVANCED PRACTICE MIDWIFE

## 2019-11-20 PROCEDURE — 74176 CT ABD & PELVIS W/O CONTRAST: CPT

## 2019-11-20 PROCEDURE — 51702 INSERT TEMP BLADDER CATH: CPT

## 2019-11-20 PROCEDURE — 86850 RBC ANTIBODY SCREEN: CPT

## 2019-11-20 PROCEDURE — 36415 COLL VENOUS BLD VENIPUNCTURE: CPT

## 2019-11-20 PROCEDURE — 85025 COMPLETE CBC W/AUTO DIFF WBC: CPT

## 2019-11-20 PROCEDURE — 90471 IMMUNIZATION ADMIN: CPT

## 2019-11-20 PROCEDURE — 10907ZC DRAINAGE OF AMNIOTIC FLUID, THERAPEUTIC FROM PRODUCTS OF CONCEPTION, VIA NATURAL OR ARTIFICIAL OPENING: ICD-10-PCS | Performed by: ADVANCED PRACTICE MIDWIFE

## 2019-11-20 PROCEDURE — 90686 IIV4 VACC NO PRSV 0.5 ML IM: CPT

## 2019-11-20 PROCEDURE — 86900 BLOOD TYPING SEROLOGIC ABO: CPT

## 2019-11-20 PROCEDURE — S0020 INJECTION, BUPIVICAINE HYDRO: HCPCS

## 2019-11-20 RX ADMIN — Medication SCH MLS: at 21:25

## 2019-11-20 RX ADMIN — Medication SCH: at 21:23

## 2019-11-20 NOTE — PDOC.LDHP
Labor and Delivery H&P


Chief complaint: other (Incrdeased kidney pain)


HPI: 





Patient arrived today to ER complaining of increased right kidney pain. Her 

nephrostomy tube has been in the process of occluding again.  On Monday she saw 

her urologist who confirmed her tube was occluding again, therefore worsening 

her hydronephrosis.  


Current gestational age (weeks): 35 (and 5 days)


Grav: 2


Para: 1


OB History Details: 





G1  at 37 weeks for hydronephrosis. 


Current pregnancy complications: none (Hydronephrosis, right,)


Abnormal US findings: Yes (Moderate right hydronephrosis)


Past Medical History: 





Urosepsis from Strep pseudomonasis





Current medications: pre-kathryn vitamins, other (macrobid 50mg PO QD)


Previous surgical history: other (stomach biopsy)


Allergies/Adverse Reactions: 


 Allergies











Allergy/AdvReac Type Severity Reaction Status Date / Time


 


No Known Allergies Allergy   Verified 19 15:05











Social history: none





- Physical Exam


Vital signs reviewed and normal: yes (109/57, HR 90,)


General: NAD


Lungs: nonlabored breathing


Abdomen: gravid


FHT: category 1





- Vaginal Exam


cm dilated: 1


Effacement: 0%


Station: -3





- OB Labs


Blood type: A


RH: positive


Antibody Screen: negative


HIV: negative


RPR: negative


HEPSAg: negative


1 hour GCT: negative


GBS: unknown


Urine drug screen: negative


Rubella: immune





- Assessment


L&D Assessment: medically indicated induction





- Plan


Plan: admit to L&D, cervical ripening, GBS antibiotic prophylaxis


-: 





anticipate 


Steroid benefits x 48+ hours achieved


CT stone protocol at 24 hours post delivery.

## 2019-11-21 RX ADMIN — Medication SCH MLS: at 01:35

## 2019-11-21 RX ADMIN — Medication PRN MLS: at 17:15

## 2019-11-21 RX ADMIN — Medication SCH MLS: at 09:57

## 2019-11-21 RX ADMIN — Medication PRN MLS: at 16:06

## 2019-11-21 RX ADMIN — Medication SCH MLS: at 05:35

## 2019-11-21 NOTE — PDOC.OPDEL
OB Operative/Delivery Note


Delivery Dr/Surgeon: DELIA Khan


Pre-Delivery Diagnosis: active labor, medically indicated induction


Procedure/Post Delivery Dx: spontaneous vaginal delivery


Weeks gestation: 35 (and 6 days)


Anesthesia: epidural





- Findings


  ** A


Sex: male





- Additional Findings/Plan


Placenta delivered: spontaneous


Repaired Obstetrical Laceration: none


Estimated blood loss: 300mL


Post delivery plan: routine recovery

## 2019-11-22 LAB
BASOPHILS # BLD AUTO: 0.1 THOU/UL (ref 0–0.2)
BASOPHILS NFR BLD AUTO: 0.4 % (ref 0–1)
EOSINOPHIL # BLD AUTO: 0.1 THOU/UL (ref 0–0.7)
EOSINOPHIL NFR BLD AUTO: 0.8 % (ref 0–10)
HGB BLD-MCNC: 12.1 G/DL (ref 12–16)
LYMPHOCYTES # BLD: 1.7 THOU/UL (ref 1.2–3.4)
LYMPHOCYTES NFR BLD AUTO: 11.7 % (ref 28–48)
MCH RBC QN AUTO: 28.6 PG (ref 25–35)
MCV RBC AUTO: 87.2 FL (ref 78–98)
MONOCYTES # BLD AUTO: 0.8 THOU/UL (ref 0.11–0.59)
MONOCYTES NFR BLD AUTO: 5.1 % (ref 0–4)
NEUTROPHILS # BLD AUTO: 12.2 THOU/UL (ref 1.4–6.5)
NEUTROPHILS NFR BLD AUTO: 82.1 % (ref 31–61)
PLATELET # BLD AUTO: 316 THOU/UL (ref 130–400)
RBC # BLD AUTO: 4.23 MILL/UL (ref 4–5.2)
WBC # BLD AUTO: 14.8 THOU/UL (ref 4.8–10.8)

## 2019-11-22 RX ADMIN — Medication SCH: at 10:03

## 2019-11-22 RX ADMIN — DOCUSATE CALCIUM SCH MG: 240 CAPSULE, LIQUID FILLED ORAL at 09:07

## 2019-11-22 RX ADMIN — DOCUSATE CALCIUM SCH: 240 CAPSULE, LIQUID FILLED ORAL at 20:16

## 2019-11-22 RX ADMIN — DOCUSATE CALCIUM SCH: 240 CAPSULE, LIQUID FILLED ORAL at 04:48

## 2019-11-22 NOTE — PDOC.PP
Post Partum Progress Note


Post Partum Day #: 1


Subjective: 





Patient is feeling better. diarrhea has stopped after taking the lomotil. Her 

kidney is not longer hurting, but feels funny.  she is on the way to the NICU 

to see the infant. 


PO intake tolerated: yes


Flatus: yes


Ambulation: yes


 Vital Signs (12 hours)











  Temp Pulse Resp BP Pulse Ox


 


 19 12:03  97.6 F  99  20  114/69 


 


 19 08:40  98.2 F  86  20  106/60  100








 Weight











Weight                         155 lb

















- Physical Examination


General: NAD


Respiratory: non-labored breathing


Abdominal: + bowel sounds, lochia (minimal)


Extremities: negative homans (B)


Skin: no rash


Neurological: no gross focal deficits


Psychiatric: A&Ox3, normal affect


Result Diagrams: 


 19 15:04





Additional Labs: 


 Post Partum Labs











Blood Type  A POSITIVE   19  16:11    


 


Hep Bs Antigen  Non-Reactive S/CO (NonReactive)   19  16:11    











(1)  (spontaneous vaginal delivery)


Code(s): O80 - ENCOUNTER FOR FULL-TERM UNCOMPLICATED DELIVERY   Status: Acute   





(2) Hydronephrosis


Code(s): N13.30 - UNSPECIFIED HYDRONEPHROSIS   Status: Acute   





- Assessment/Plan





A:  now P2 s/p  following medically indicated induction of labor for r. 

hydronephrosis and nephrostomy tube partial occlusion


CT neg for nephrolithiasis


Dr. Mercado d/c nephrotomy tube this afternoon


Will repeat CBC in am to ensure WBC are dropping, if not will start on PO ABX 

for UTI per consultation with urologist


P: routine care


repeat cbc in am


Evaluated for discharge home tomorrow.

## 2019-11-22 NOTE — CT
ABDOMEN CT WITHOUT CONTRAST

PELVIC CT WITHOUT CONTRAST:



HISTORY: 

Postpartum. Right flank pain. Evaluate for renal calculus. Nephrostomy tube placed in September.



COMPARISON:

 None.



FINDINGS:



Abdomen CT:

Lung bases:Clear.

Heart size: Normal heart size.

Aorta: Normal caliber aorta.

Solid organs: Limited evaluation by the lack of IV contrast. Grossly no solid organ abnormality.

Lymph nodes: No gastrohepatic, retrocrural or periportal lymphadenopathy.

Gallbladder: Unremarkable.

Mesentery: No mass, lymphadenopathy, free air or free fluid.

Kidneys: Right-sided percutaneous nephrostomy tube is noted. The pigtail is in the right renal pelvis
. Small punctate 1 mm calculus in the lower pole right kidney. No evidence of right-sided

obstructive uropathy. No evidence of left-sided obstructive uropathy.

Alimentary canal: Limited evaluation by technique. No evidence of bowel obstruction. Ileocecal juncti
on is unremarkable. Suggestion of a normal caliber appendix. No obvious inflammation at the cecal

apex. Scattered fecal material in a nondistended, nondilated colon.



CT PELVIS: 

Enlarged heterogeneous uterus with possible small amount of blood products in the lower uterine segme
nt vaginal vault. Findings are compatible with postpartum state. Trace amount of free fluid in the

pelvis.

Urinary bladder: Unremarkable.



Osseous structures: No lytic or blastic lesions.



IMPRESSION:

1. Appropriate positioning of a right-sided percutaneous nephrostomy. Bilaterally no evidence of obst
ructive uropathy.

2. Postpartum changes in the uterus.



Transcribed Date/Time: 11/22/2019 3:04 PM



Reported By: Annette Escobedo 

Electronically Signed:  11/22/2019 3:19 PM

## 2019-11-22 NOTE — CON
DATE OF CONSULTATION:  2019



CONSULTING:  Lauryn Khan.



CONSULTED:  Dr. Askew.



REASON FOR CONSULTATION:  Nephrostomy tube issues and possible infection.



HISTORY OF PRESENT ILLNESS:  Mrs. Garcia is a 19-year-old white female, G2, P2,

recently delivered her  baby, who is currently in the NICU.  He is doing

well.  She is known to me for history of right-sided hydronephrosis secondary to

uterine compression from uterine lie, resulting in a blockage.  Due to excessive

pain and infection, she had a nephrostomy tube placed and has had a uma course

since that time.  We had elected that if she were to deliver her baby, then we would

go ahead and take her nephrostomy tube out.  She was recently admitted to the

hospital for uncontrolled right-sided flank pain.  As per the plan, she had already

received steroids for fetal lung development and underwent induction and has

delivered her baby.  CT per my request has already been completed, which

demonstrates no evidence of ureterolithiasis or obstructive uropathy.  I have been

consulted for decision on whether or not keep the nephrostomy tube and for any

further recommendations.  On my discussion with the patient, she states that she is

feeling okay.  She is still having some right-sided flank discomfort, although it is

relatively better at this time.  She denies any fevers.  She states she is otherwise

doing well. 



ALLERGIES:  NONE.



CURRENT MEDICATIONS:  

1. Prenatal vitamins.

2. Macrobid prophylaxis.



PAST MEDICAL HISTORY:  Urosepsis from strep Pseudomonas.



PAST SURGICAL HISTORY:  

1. Nephrostomy tube placement.

2. Stomach biopsy.



FAMILY HISTORY:  Noncontributory.



SOCIAL HISTORY:  The patient denies alcohol abuse, illicit drug use, or tobacco

abuse. 



REVIEW OF SYSTEMS:  A 12-point review of systems was reviewed and negative,

otherwise unless commented on the HPI. 



PHYSICAL EXAMINATION:

VITAL SIGNS:  Temperature 97.6, pulse 99, respirations 20, blood pressure 114/69,

and saturation 100% on room air. 

GENERAL:  No apparent distress, communicative, and alert. 

HEENT:  Normocephalic and atraumatic.  Pupils are symmetric and round.  Trachea

midline.  Moist mucous membranes. 

CARDIOVASCULAR:  Regular rate and rhythm.  Normal S1 and S2.  Symmetric pulses. 

CHEST:  No increased work of breathing.  Symmetric expansion of the lungs.  Clear

anteriorly. 

ABDOMEN:  Soft, nontender, and nondistended.  Postpartum uterus, which is still

slightly enlarged. 

BACK:  Right nephrostomy tube in place with still clear yellow urine draining.

There is no evidence of surrounding cellulitis. 

:  Deferred at this time. 

EXTREMITIES:  Trace edema bilaterally.  No clubbing or cyanosis. 

MUSCULOSKELETAL:  No joint deformity or joint erythema noted.  Full range of motion. 

NEUROLOGIC:  Cranial nerves 2 through 12 grossly intact.  No focal or sensory motor

deficits identified. 

PSYCHIATRIC:  Alert and oriented x3.  Appropriate mood and affect. 

SKIN:  Warm and dry.  No rashes or lesions.  Good turgor. 

LYMPH:  There are no enlarged lymph nodes in the cervical, supraclavicular,

axillary, or inguinal regions. 



LABORATORY EVALUATION:  The full set of labs are in the [facility] system, which I

have reviewed.  Of note, the patient's white count was 16.2 yesterday, 14.8 today.

CT done today without contrast demonstrates no evidence of obstructive uropathy.  No

hydronephrosis.  No nephrolithiasis.  There is a right-sided percutaneous

nephrostomy tube in the correct location.  Postpartum changes of the uterus are

noted. 



ASSESSMENT AND PLAN:  A 19-year-old white female with a right-sided nephrostomy tube

for uterine obstruction and hydronephrosis with pain with elevated white count

currently.  It is possible that she is starting to get infection, but has not

demonstrated any signs of infection currently.  I have removed the nephrostomy tube

already.  She has a dressing in place.  I would recommend rechecking a CBC tomorrow.

 If her white count goes down and she has no fevers, I do not think antibiotics are

necessary.  Her Macrobid prophylaxis can be stopped at this point.  If her white

count stays elevated or increases or she develops a fever, then I would treat her

with Omnicef or any other  antibiotic, which is compatible with lactation.

Levaquin probably should not be used since she is going to be breast-feeding.  Once

she has been deemed stable from a urologic standpoint without infection, she can be

discharged home and I will plan to see her back in approximately 2 months with a

renal ultrasound done prior for finalization to ensure that there is resolution of

all hydronephrosis and no further problems.  I have already counseled her that I did

not recommend that she become pregnant again or she would risk having the same

problem.  She agrees and states that she will probably avoid future pregnancy and

will adopt any future children if she desires.  I will sign out to Dr. Dung Tee, who is covering for me this weekend on-call. 







Job ID:  [id number]

## 2019-11-23 VITALS — SYSTOLIC BLOOD PRESSURE: 110 MMHG | DIASTOLIC BLOOD PRESSURE: 73 MMHG | TEMPERATURE: 98 F

## 2019-11-23 LAB
BASOPHILS # BLD AUTO: 0 THOU/UL (ref 0–0.2)
BASOPHILS NFR BLD AUTO: 0.2 % (ref 0–1)
EOSINOPHIL # BLD AUTO: 0.2 THOU/UL (ref 0–0.7)
EOSINOPHIL NFR BLD AUTO: 1.6 % (ref 0–10)
HGB BLD-MCNC: 11.5 G/DL (ref 12–16)
LYMPHOCYTES # BLD: 2.8 THOU/UL (ref 1.2–3.4)
LYMPHOCYTES NFR BLD AUTO: 23.6 % (ref 28–48)
MCH RBC QN AUTO: 28.7 PG (ref 25–35)
MCV RBC AUTO: 86.5 FL (ref 78–98)
MONOCYTES # BLD AUTO: 0.9 THOU/UL (ref 0.11–0.59)
MONOCYTES NFR BLD AUTO: 8 % (ref 0–4)
NEUTROPHILS # BLD AUTO: 7.8 THOU/UL (ref 1.4–6.5)
NEUTROPHILS NFR BLD AUTO: 66.6 % (ref 31–61)
PLATELET # BLD AUTO: 296 THOU/UL (ref 130–400)
RBC # BLD AUTO: 4.02 MILL/UL (ref 4–5.2)
WBC # BLD AUTO: 11.7 THOU/UL (ref 4.8–10.8)

## 2019-11-23 RX ADMIN — DOCUSATE CALCIUM SCH MG: 240 CAPSULE, LIQUID FILLED ORAL at 10:30

## 2019-11-23 NOTE — PDOC.PP
Post Partum Progress Note


Post Partum Day #: 2


Subjective: 





Patient is feeling much better.  No kidney pain. just cramping with pumping. 


PO intake tolerated: yes


Flatus: yes


Ambulation: yes


 Vital Signs (12 hours)











  Temp Pulse Resp BP Pulse Ox


 


 19 08:18  98.0 F  68  16  110/73  96


 


 19 00:48  98.1 F  61  16  110/56 L 








 Weight











Weight                         155 lb

















- Physical Examination


General: NAD


Respiratory: non-labored breathing


Abdominal: lochia (minimal)


Skin: no rash


Neurological: no gross focal deficits


Result Diagrams: 


 19 06:09





Additional Labs: 


 Post Partum Labs











Blood Type  A POSITIVE   19  16:11    


 


Hep Bs Antigen  Non-Reactive S/CO (NonReactive)   19  16:11    











(1)  (spontaneous vaginal delivery)


Code(s): O80 - ENCOUNTER FOR FULL-TERM UNCOMPLICATED DELIVERY   Status: Acute   





(2) Hydronephrosis


Code(s): N13.30 - UNSPECIFIED HYDRONEPHROSIS   Status: Acute   





- Assessment/Plan





A:  now p2 s/p  following IOL for hydronephrosis. 


P: Discharge home.

## 2020-01-04 ENCOUNTER — HOSPITAL ENCOUNTER (OUTPATIENT)
Dept: HOSPITAL 92 - ERS | Age: 20
Setting detail: OBSERVATION
LOS: 1 days | Discharge: HOME | End: 2020-01-05
Attending: SURGERY | Admitting: SURGERY
Payer: SELF-PAY

## 2020-01-04 VITALS — BODY MASS INDEX: 24.8 KG/M2

## 2020-01-04 DIAGNOSIS — K80.12: Primary | ICD-10-CM

## 2020-01-04 LAB
ALBUMIN SERPL BCG-MCNC: 4.3 G/DL (ref 3.5–5)
ALP SERPL-CCNC: 99 U/L (ref 40–100)
ALT SERPL W P-5'-P-CCNC: 27 U/L (ref 8–55)
ANION GAP SERPL CALC-SCNC: 12 MMOL/L (ref 10–20)
AST SERPL-CCNC: 22 U/L (ref 5–34)
BACTERIA UR QL AUTO: (no result) HPF
BASOPHILS # BLD AUTO: 0 THOU/UL (ref 0–0.2)
BASOPHILS NFR BLD AUTO: 0.1 % (ref 0–1)
BILIRUB SERPL-MCNC: 0.2 MG/DL (ref 0.2–1.2)
BUN SERPL-MCNC: 18 MG/DL (ref 7–18.7)
CALCIUM SERPL-MCNC: 9.5 MG/DL (ref 7.8–10.44)
CHLORIDE SERPL-SCNC: 104 MMOL/L (ref 98–107)
CO2 SERPL-SCNC: 26 MMOL/L (ref 22–29)
CREAT CL PREDICTED SERPL C-G-VRATE: 0 ML/MIN (ref 70–130)
EOSINOPHIL # BLD AUTO: 0.2 THOU/UL (ref 0–0.7)
EOSINOPHIL NFR BLD AUTO: 2 % (ref 0–10)
GLOBULIN SER CALC-MCNC: 3 G/DL (ref 2.4–3.5)
GLUCOSE SERPL-MCNC: 92 MG/DL (ref 70–105)
HGB BLD-MCNC: 13 G/DL (ref 12–16)
LEUKOCYTE ESTERASE UR QL STRIP.AUTO: 75 LEU/UL
LIPASE SERPL-CCNC: 37 U/L (ref 8–78)
LYMPHOCYTES # BLD: 3 THOU/UL (ref 1.2–3.4)
LYMPHOCYTES NFR BLD AUTO: 30.2 % (ref 28–48)
MCH RBC QN AUTO: 29.3 PG (ref 25–35)
MCV RBC AUTO: 85.9 FL (ref 78–98)
MONOCYTES # BLD AUTO: 0.7 THOU/UL (ref 0.11–0.59)
MONOCYTES NFR BLD AUTO: 6.4 % (ref 0–4)
NEUTROPHILS # BLD AUTO: 6.1 THOU/UL (ref 1.4–6.5)
NEUTROPHILS NFR BLD AUTO: 61.2 % (ref 31–61)
PLATELET # BLD AUTO: 225 THOU/UL (ref 130–400)
POTASSIUM SERPL-SCNC: 4 MMOL/L (ref 3.5–5.1)
PREGU CONTROL BACKGROUND?: (no result)
PREGU CONTROL BAR APPEAR?: YES
RBC # BLD AUTO: 4.44 MILL/UL (ref 4–5.2)
RBC UR QL AUTO: (no result) HPF (ref 0–3)
SODIUM SERPL-SCNC: 138 MMOL/L (ref 136–145)
WBC # BLD AUTO: 10 THOU/UL (ref 4.8–10.8)
WBC UR QL AUTO: (no result) HPF (ref 0–3)

## 2020-01-04 PROCEDURE — 96365 THER/PROPH/DIAG IV INF INIT: CPT

## 2020-01-04 PROCEDURE — 81003 URINALYSIS AUTO W/O SCOPE: CPT

## 2020-01-04 PROCEDURE — 88304 TISSUE EXAM BY PATHOLOGIST: CPT

## 2020-01-04 PROCEDURE — 96375 TX/PRO/DX INJ NEW DRUG ADDON: CPT

## 2020-01-04 PROCEDURE — 81025 URINE PREGNANCY TEST: CPT

## 2020-01-04 PROCEDURE — 71045 X-RAY EXAM CHEST 1 VIEW: CPT

## 2020-01-04 PROCEDURE — 80053 COMPREHEN METABOLIC PANEL: CPT

## 2020-01-04 PROCEDURE — 76705 ECHO EXAM OF ABDOMEN: CPT

## 2020-01-04 PROCEDURE — 81015 MICROSCOPIC EXAM OF URINE: CPT

## 2020-01-04 PROCEDURE — G0378 HOSPITAL OBSERVATION PER HR: HCPCS

## 2020-01-04 PROCEDURE — 85025 COMPLETE CBC W/AUTO DIFF WBC: CPT

## 2020-01-04 PROCEDURE — S0020 INJECTION, BUPIVICAINE HYDRO: HCPCS

## 2020-01-04 PROCEDURE — 96361 HYDRATE IV INFUSION ADD-ON: CPT

## 2020-01-04 PROCEDURE — 83690 ASSAY OF LIPASE: CPT

## 2020-01-04 NOTE — RAD
XR Chest 1 View Portable



HISTORY: Chest pain



COMPARISON: 10/25/2017 study



FINDINGS: Heart size and mediastinum are within normal limits. The lungs are clear of infiltrates. No
 bony findings.



IMPRESSION: Unremarkable chest.



Reported By: Kobi Mota 

Electronically Signed:  1/4/2020 10:09 PM

## 2020-01-04 NOTE — ULT
US Gallbladder RUQ



HISTORY: Right upper quadrant pain.



COMPARISON: None.



FINDINGS: Real-time imaging of the right upper quadrant shows echogenic foci with shadowing within th
e gallbladder consistent with small stones. There is some gallbladder wall edema noted. There is a

positive ultrasound Alas sign.



Visualized liver parenchyma shows no focal findings. The common duct is 3 mm.



Pancreas and right kidney are normal in appearance.



IMPRESSION: Multiple cholelithiasis with a normal caliber common duct with a slightly thickened gallb
ladder wall with some mild gallbladder wall edema and a positive ultrasound Alas sign.



Reported By: Kobi Mota 

Electronically Signed:  1/4/2020 10:30 PM

## 2020-01-05 VITALS — TEMPERATURE: 97.6 F

## 2020-01-05 VITALS — DIASTOLIC BLOOD PRESSURE: 56 MMHG | SYSTOLIC BLOOD PRESSURE: 91 MMHG

## 2020-01-05 PROCEDURE — 0FT44ZZ RESECTION OF GALLBLADDER, PERCUTANEOUS ENDOSCOPIC APPROACH: ICD-10-PCS | Performed by: SURGERY

## 2020-01-05 NOTE — OP
DATE OF PROCEDURE:  01/05/2020



PREOPERATIVE DIAGNOSIS:  Cholecystitis.



POSTOPERATIVE DIAGNOSIS:  Cholecystitis.



PROCEDURE PERFORMED:  Laparoscopic cholecystectomy.



ANESTHESIA:  General.



EBL:  Minimal.



SPECIMENS:  Gallbladder.



COMPLICATIONS:  None.



COUNTS:  Sponge and needle count were correct x2.



INDICATIONS FOR PROCEDURE:  The patient is a 19-year-old female, who presented to

the hospital with signs and symptoms of acute cholecystitis.  She is being taken to

the operating room for cholecystectomy. 



FINDINGS:  Acute cholecystitis.



CONDUCT OF THE OPERATION:  After written preoperative informed consent, the patient

was brought to the operating room, placed in the supine position and intubated.  The

abdomen was prepared and draped in sterile fashion.  A time-out was performed. 



A direct optical entry technique was used in the right upper quadrant.

Pneumoperitoneum was established.  The underlying viscera were inspected and there

was no damage noted.  A 10 mm port was placed at the umbilicus and two additional

ports were placed onto the right costal margin. 



The fundus of the gallbladder was grasped and retracted over the dome of the liver.

The infundibulum was retracted laterally.  Dissection was carried out in the

triangle of [place] until the critical view of safety was obtained.  Clips were placed

proximally and distally on the cystic artery and duct and they were divided.  The

gallbladder was removed from the gallbladder bed fossa utilizing hook

electrocautery.  The specimen was placed in an EndoCatch bag and removed via the

umbilicus.  The right upper quadrant was surveilled and there was no bleeding or

bile staining noted.  The umbilical defect was closed with a zero Vicryl suture

utilizing a suture passer.  The pneumoperitoneum was released and the remaining 5 mm

trocars were removed.  The incisions were irrigated, dried, and hemostasis was

noted.  Incisions were closed with Monocryl and Dermabond.  The patient was

extubated and taken to the post anesthesia care area in good condition having

tolerated the procedure well. 







Job ID:  [id number]

## 2020-01-06 NOTE — DIS
DATE OF ADMISSION:  01/04/2020



DATE OF DISCHARGE:  01/05/2020



REASON FOR ADMISSION:  Cholecystitis.



DISCHARGE DIAGNOSIS:  Cholecystitis.



PROCEDURES PERFORMED:  Laparoscopic cholecystectomy on January 5, 2020.



CLINICAL COURSE:  The patient is a 19-year-old female who presented to the emergency

room with signs and symptoms of cholecystitis.  She was brought into the hospital

and then taken to the operating room for laparoscopic cholecystectomy.  Findings

were consistent with acute cholecystitis.  She did well and was discharged home. 



PHYSICAL EXAMINATION:

GENERAL:  Healthy appearing, well nourished. 

HEART:  Regular. 

LUNGS:  Grossly clear to auscultation. 

ABDOMEN:  Soft, appropriately tender, nondistended, normoactive bowel sounds.

Incisions are clean, dry, and intact without erythema or drainage. 



DISPOSITION:  Home.



MEDICATIONS:  Norco 5/325 mg take one tab by mouth every 6 hours as needed for pain.



FOLLOWUP:  She is to follow up with Dr. Ledbetter at the [facility] location in

approximately 2 weeks.  Contact information was given to the patient. 







Job ID:  [id number]

## 2020-01-20 ENCOUNTER — HOSPITAL ENCOUNTER (OUTPATIENT)
Dept: HOSPITAL 92 - BICULT | Age: 20
Discharge: HOME | End: 2020-01-20
Attending: UROLOGY
Payer: COMMERCIAL

## 2020-01-20 DIAGNOSIS — R10.83: ICD-10-CM

## 2020-01-20 DIAGNOSIS — N13.30: Primary | ICD-10-CM

## 2020-01-20 PROCEDURE — 76770 US EXAM ABDO BACK WALL COMP: CPT

## 2020-01-20 NOTE — ULT
RENAL ULTRASOUND



HISTORY: Evaluate for hydronephrosis



COMPARISON: CT of the abdomen and pelvis without contrast dated November 22, 2019



FINDINGS: 



Right Kidney:

Size:  11.1 x 4.3 x 5.3 cm

Abnormality: Normal cortical echotexture. No hydronephrosis.



Left Kidney:

Size:  10.5 x 5.1 x 4.9 cm.

Abnormality: Normal cortical echotexture. No hydronephrosis



Urinary bladder: Normal mucosa.





IMPRESSION: No hydronephrosis.



Reported By: Ramin Driver 

Electronically Signed:  1/20/2020 1:48 PM

## 2020-10-07 ENCOUNTER — HOSPITAL ENCOUNTER (EMERGENCY)
Dept: HOSPITAL 92 - ERS | Age: 20
Discharge: HOME | End: 2020-10-07
Payer: COMMERCIAL

## 2020-10-07 DIAGNOSIS — K27.9: ICD-10-CM

## 2020-10-07 DIAGNOSIS — R11.2: ICD-10-CM

## 2020-10-07 DIAGNOSIS — I88.0: Primary | ICD-10-CM

## 2020-10-07 LAB
ALBUMIN SERPL BCG-MCNC: 4.1 G/DL (ref 3.5–5)
ALP SERPL-CCNC: 95 U/L (ref 40–110)
ALT SERPL W P-5'-P-CCNC: 20 U/L (ref 8–55)
ANION GAP SERPL CALC-SCNC: 16 MMOL/L (ref 10–20)
AST SERPL-CCNC: 14 U/L (ref 5–34)
BACTERIA UR QL AUTO: (no result) HPF
BASOPHILS # BLD AUTO: 0 THOU/UL (ref 0–0.2)
BASOPHILS NFR BLD AUTO: 0.2 % (ref 0–1)
BILIRUB SERPL-MCNC: 0.7 MG/DL (ref 0.2–1.2)
BUN SERPL-MCNC: 12 MG/DL (ref 7–18.7)
CALCIUM SERPL-MCNC: 9.2 MG/DL (ref 7.8–10.44)
CHLORIDE SERPL-SCNC: 103 MMOL/L (ref 98–107)
CO2 SERPL-SCNC: 22 MMOL/L (ref 22–29)
CREAT CL PREDICTED SERPL C-G-VRATE: 0 ML/MIN (ref 70–130)
EOSINOPHIL # BLD AUTO: 0.1 THOU/UL (ref 0–0.7)
EOSINOPHIL NFR BLD AUTO: 0.3 % (ref 0–10)
GLOBULIN SER CALC-MCNC: 3.7 G/DL (ref 2.4–3.5)
GLUCOSE SERPL-MCNC: 85 MG/DL (ref 70–105)
HGB BLD-MCNC: 13.7 G/DL (ref 12–16)
LEUKOCYTE ESTERASE UR QL STRIP.AUTO: 75 LEU/UL
LIPASE SERPL-CCNC: 17 U/L (ref 8–78)
LYMPHOCYTES # BLD: 1.9 THOU/UL (ref 1.2–3.4)
LYMPHOCYTES NFR BLD AUTO: 10.1 % (ref 21–51)
MCH RBC QN AUTO: 28.7 PG (ref 27–31)
MCV RBC AUTO: 86.8 FL (ref 78–98)
MONOCYTES # BLD AUTO: 1.2 THOU/UL (ref 0.11–0.59)
MONOCYTES NFR BLD AUTO: 6.3 % (ref 0–10)
NEUTROPHILS # BLD AUTO: 15.5 THOU/UL (ref 1.4–6.5)
NEUTROPHILS NFR BLD AUTO: 83.1 % (ref 42–75)
PLATELET # BLD AUTO: 270 THOU/UL (ref 130–400)
POTASSIUM SERPL-SCNC: 3.6 MMOL/L (ref 3.5–5.1)
PREGU CONTROL BACKGROUND?: (no result)
PREGU CONTROL BAR APPEAR?: YES
PROT UR STRIP.AUTO-MCNC: 50 MG/DL
RBC # BLD AUTO: 4.78 MILL/UL (ref 4.2–5.4)
SODIUM SERPL-SCNC: 137 MMOL/L (ref 136–145)
SP GR UR STRIP: 1.02 (ref 1–1.04)
WBC # BLD AUTO: 18.6 THOU/UL (ref 4.8–10.8)
WBC UR QL AUTO: (no result) HPF (ref 0–3)

## 2020-10-07 PROCEDURE — 81015 MICROSCOPIC EXAM OF URINE: CPT

## 2020-10-07 PROCEDURE — 85025 COMPLETE CBC W/AUTO DIFF WBC: CPT

## 2020-10-07 PROCEDURE — 96374 THER/PROPH/DIAG INJ IV PUSH: CPT

## 2020-10-07 PROCEDURE — 81025 URINE PREGNANCY TEST: CPT

## 2020-10-07 PROCEDURE — 74177 CT ABD & PELVIS W/CONTRAST: CPT

## 2020-10-07 PROCEDURE — 81003 URINALYSIS AUTO W/O SCOPE: CPT

## 2020-10-07 PROCEDURE — 80053 COMPREHEN METABOLIC PANEL: CPT

## 2020-10-07 PROCEDURE — 83690 ASSAY OF LIPASE: CPT

## 2020-10-07 PROCEDURE — S0028 INJECTION, FAMOTIDINE, 20 MG: HCPCS

## 2020-10-07 PROCEDURE — 96375 TX/PRO/DX INJ NEW DRUG ADDON: CPT

## 2020-10-07 NOTE — CT
CT ABDOMEN AND PELVIS WITH IV CONTRAST:

 

HISTORY: 

Intermittent epigastric pain with multiple episodes of nausea and vomiting.

 

FINDINGS: 

The lung bases are clear.  The patient is post cholecystectomy.  The liver, spleen, pancreas, adrenal
 glands, and kidneys are normal.

 

No free air is seen in the abdomen or pelvis.  The aorta is normal caliber.  Uterus and ovaries are p
resent.  The bony structures are unremarkable.  There are prominent mesenteric lymph nodes, particula
rly in the ileocecal chain.  A normal-appearing appendix is present.  A tiny amount of free fluid is 
seen in the pelvis.

 

IMPRESSION: 

Findings are suggestive of mesenteric adenitis.

 

POS: AH

## 2020-10-09 ENCOUNTER — HOSPITAL ENCOUNTER (EMERGENCY)
Dept: HOSPITAL 92 - ERS | Age: 20
Discharge: HOME | End: 2020-10-09
Payer: COMMERCIAL

## 2020-10-09 DIAGNOSIS — R11.2: ICD-10-CM

## 2020-10-09 DIAGNOSIS — K29.70: Primary | ICD-10-CM

## 2020-10-09 LAB
ALBUMIN SERPL BCG-MCNC: 4.3 G/DL (ref 3.5–5)
ALP SERPL-CCNC: 88 U/L (ref 40–110)
ALT SERPL W P-5'-P-CCNC: 25 U/L (ref 8–55)
ANION GAP SERPL CALC-SCNC: 12 MMOL/L (ref 10–20)
AST SERPL-CCNC: 19 U/L (ref 5–34)
BACTERIA UR QL AUTO: (no result) HPF
BASOPHILS # BLD AUTO: 0.1 THOU/UL (ref 0–0.2)
BASOPHILS NFR BLD AUTO: 0.5 % (ref 0–1)
BILIRUB SERPL-MCNC: 0.2 MG/DL (ref 0.2–1.2)
BUN SERPL-MCNC: 8 MG/DL (ref 7–18.7)
CALCIUM SERPL-MCNC: 9.2 MG/DL (ref 7.8–10.44)
CHLORIDE SERPL-SCNC: 105 MMOL/L (ref 98–107)
CO2 SERPL-SCNC: 26 MMOL/L (ref 22–29)
CREAT CL PREDICTED SERPL C-G-VRATE: 0 ML/MIN (ref 70–130)
EOSINOPHIL # BLD AUTO: 0.2 THOU/UL (ref 0–0.7)
EOSINOPHIL NFR BLD AUTO: 1.9 % (ref 0–10)
GLOBULIN SER CALC-MCNC: 3.4 G/DL (ref 2.4–3.5)
GLUCOSE SERPL-MCNC: 84 MG/DL (ref 70–105)
HGB BLD-MCNC: 13.6 G/DL (ref 12–16)
LIPASE SERPL-CCNC: 20 U/L (ref 8–78)
LYMPHOCYTES # BLD: 2.2 THOU/UL (ref 1.2–3.4)
LYMPHOCYTES NFR BLD AUTO: 20.8 % (ref 21–51)
MCH RBC QN AUTO: 28.6 PG (ref 27–31)
MCV RBC AUTO: 87.8 FL (ref 78–98)
MONOCYTES # BLD AUTO: 0.7 THOU/UL (ref 0.11–0.59)
MONOCYTES NFR BLD AUTO: 7 % (ref 0–10)
NEUTROPHILS # BLD AUTO: 7.4 THOU/UL (ref 1.4–6.5)
NEUTROPHILS NFR BLD AUTO: 69.8 % (ref 42–75)
PLATELET # BLD AUTO: 284 THOU/UL (ref 130–400)
POTASSIUM SERPL-SCNC: 4.9 MMOL/L (ref 3.5–5.1)
PREGU CONTROL BACKGROUND?: (no result)
PREGU CONTROL BAR APPEAR?: YES
PROT UR STRIP.AUTO-MCNC: 30 MG/DL
RBC # BLD AUTO: 4.75 MILL/UL (ref 4.2–5.4)
RBC UR QL AUTO: (no result) HPF (ref 0–3)
SODIUM SERPL-SCNC: 138 MMOL/L (ref 136–145)
SP GR UR STRIP: 1.01 (ref 1–1.04)
WBC # BLD AUTO: 10.5 THOU/UL (ref 4.8–10.8)
WBC UR QL AUTO: (no result) HPF (ref 0–3)

## 2020-10-09 PROCEDURE — 94760 N-INVAS EAR/PLS OXIMETRY 1: CPT

## 2020-10-09 PROCEDURE — 36415 COLL VENOUS BLD VENIPUNCTURE: CPT

## 2020-10-09 PROCEDURE — 83690 ASSAY OF LIPASE: CPT

## 2020-10-09 PROCEDURE — 85025 COMPLETE CBC W/AUTO DIFF WBC: CPT

## 2020-10-09 PROCEDURE — 80053 COMPREHEN METABOLIC PANEL: CPT

## 2020-10-09 PROCEDURE — 81003 URINALYSIS AUTO W/O SCOPE: CPT

## 2020-10-09 PROCEDURE — 81015 MICROSCOPIC EXAM OF URINE: CPT

## 2020-10-09 PROCEDURE — 81025 URINE PREGNANCY TEST: CPT

## 2020-11-04 ENCOUNTER — HOSPITAL ENCOUNTER (EMERGENCY)
Dept: HOSPITAL 92 - ERS | Age: 20
Discharge: HOME | End: 2020-11-04
Payer: SELF-PAY

## 2020-11-04 DIAGNOSIS — Z79.899: ICD-10-CM

## 2020-11-04 DIAGNOSIS — N92.6: Primary | ICD-10-CM

## 2020-11-04 LAB
ALBUMIN SERPL BCG-MCNC: 4.7 G/DL (ref 3.5–5)
ALP SERPL-CCNC: 87 U/L (ref 40–110)
ALT SERPL W P-5'-P-CCNC: 16 U/L (ref 8–55)
ANION GAP SERPL CALC-SCNC: 14 MMOL/L (ref 10–20)
AST SERPL-CCNC: 18 U/L (ref 5–34)
BACTERIA UR QL AUTO: (no result) HPF
BASOPHILS # BLD AUTO: 0.1 THOU/UL (ref 0–0.2)
BASOPHILS NFR BLD AUTO: 0.7 % (ref 0–1)
BILIRUB SERPL-MCNC: 0.2 MG/DL (ref 0.2–1.2)
BUN SERPL-MCNC: 14 MG/DL (ref 7–18.7)
CALCIUM SERPL-MCNC: 10 MG/DL (ref 7.8–10.44)
CHLORIDE SERPL-SCNC: 102 MMOL/L (ref 98–107)
CO2 SERPL-SCNC: 25 MMOL/L (ref 22–29)
CREAT CL PREDICTED SERPL C-G-VRATE: 0 ML/MIN (ref 70–130)
EOSINOPHIL # BLD AUTO: 0.4 THOU/UL (ref 0–0.7)
EOSINOPHIL NFR BLD AUTO: 3 % (ref 0–10)
GLOBULIN SER CALC-MCNC: 3.7 G/DL (ref 2.4–3.5)
GLUCOSE SERPL-MCNC: 93 MG/DL (ref 70–105)
HGB BLD-MCNC: 14.6 G/DL (ref 12–16)
LYMPHOCYTES # BLD: 3.7 THOU/UL (ref 1.2–3.4)
LYMPHOCYTES NFR BLD AUTO: 27.4 % (ref 21–51)
MCH RBC QN AUTO: 28.5 PG (ref 27–31)
MCV RBC AUTO: 87.3 FL (ref 78–98)
MONOCYTES # BLD AUTO: 0.9 THOU/UL (ref 0.11–0.59)
MONOCYTES NFR BLD AUTO: 6.8 % (ref 0–10)
NEUTROPHILS # BLD AUTO: 8.4 THOU/UL (ref 1.4–6.5)
NEUTROPHILS NFR BLD AUTO: 62.2 % (ref 42–75)
PLATELET # BLD AUTO: 290 THOU/UL (ref 130–400)
POTASSIUM SERPL-SCNC: 4.1 MMOL/L (ref 3.5–5.1)
PREGS CONTROL BACKGROUND?: (no result)
PREGS CONTROL BAR APPEAR?: YES
RBC # BLD AUTO: 5.1 MILL/UL (ref 4.2–5.4)
RBC UR QL AUTO: (no result) HPF (ref 0–3)
SODIUM SERPL-SCNC: 137 MMOL/L (ref 136–145)
SP GR UR STRIP: 1.02 (ref 1–1.03)
WBC # BLD AUTO: 13.5 THOU/UL (ref 4.8–10.8)
WBC UR QL AUTO: (no result) HPF (ref 0–3)

## 2020-11-04 PROCEDURE — 36415 COLL VENOUS BLD VENIPUNCTURE: CPT

## 2020-11-04 PROCEDURE — 96372 THER/PROPH/DIAG INJ SC/IM: CPT

## 2020-11-04 PROCEDURE — 85025 COMPLETE CBC W/AUTO DIFF WBC: CPT

## 2020-11-04 PROCEDURE — 99284 EMERGENCY DEPT VISIT MOD MDM: CPT

## 2020-11-04 PROCEDURE — 81003 URINALYSIS AUTO W/O SCOPE: CPT

## 2020-11-04 PROCEDURE — 80053 COMPREHEN METABOLIC PANEL: CPT

## 2020-11-04 PROCEDURE — 84703 CHORIONIC GONADOTROPIN ASSAY: CPT

## 2020-11-04 PROCEDURE — 84702 CHORIONIC GONADOTROPIN TEST: CPT

## 2020-11-04 PROCEDURE — 81015 MICROSCOPIC EXAM OF URINE: CPT

## 2020-12-16 ENCOUNTER — HOSPITAL ENCOUNTER (EMERGENCY)
Dept: HOSPITAL 92 - ERS | Age: 20
Discharge: HOME | End: 2020-12-16
Payer: COMMERCIAL

## 2020-12-16 DIAGNOSIS — R10.30: Primary | ICD-10-CM

## 2020-12-16 LAB
ALBUMIN SERPL BCG-MCNC: 4.7 G/DL (ref 3.5–5)
ALP SERPL-CCNC: 89 U/L (ref 40–110)
ALT SERPL W P-5'-P-CCNC: 15 U/L (ref 8–55)
ANION GAP SERPL CALC-SCNC: 15 MMOL/L (ref 10–20)
AST SERPL-CCNC: 13 U/L (ref 5–34)
BACTERIA UR QL AUTO: (no result) HPF
BASOPHILS # BLD AUTO: 0 THOU/UL (ref 0–0.2)
BASOPHILS NFR BLD AUTO: 0.4 % (ref 0–1)
BILIRUB SERPL-MCNC: 0.5 MG/DL (ref 0.2–1.2)
BUN SERPL-MCNC: 11 MG/DL (ref 7–18.7)
CALCIUM SERPL-MCNC: 9.3 MG/DL (ref 7.8–10.44)
CHLORIDE SERPL-SCNC: 103 MMOL/L (ref 98–107)
CO2 SERPL-SCNC: 25 MMOL/L (ref 22–29)
CREAT CL PREDICTED SERPL C-G-VRATE: 0 ML/MIN (ref 70–130)
EOSINOPHIL # BLD AUTO: 0.2 THOU/UL (ref 0–0.7)
EOSINOPHIL NFR BLD AUTO: 1.5 % (ref 0–10)
GLOBULIN SER CALC-MCNC: 3.3 G/DL (ref 2.4–3.5)
GLUCOSE SERPL-MCNC: 90 MG/DL (ref 70–105)
HGB BLD-MCNC: 14.4 G/DL (ref 12–16)
LEUKOCYTE ESTERASE UR QL STRIP.AUTO: 25 LEU/UL
LYMPHOCYTES # BLD: 2.3 THOU/UL (ref 1.2–3.4)
LYMPHOCYTES NFR BLD AUTO: 22.4 % (ref 21–51)
MCH RBC QN AUTO: 28.9 PG (ref 27–31)
MCV RBC AUTO: 88.5 FL (ref 78–98)
MONOCYTES # BLD AUTO: 0.6 THOU/UL (ref 0.11–0.59)
MONOCYTES NFR BLD AUTO: 5.7 % (ref 0–10)
NEUTROPHILS # BLD AUTO: 7.3 THOU/UL (ref 1.4–6.5)
NEUTROPHILS NFR BLD AUTO: 69.9 % (ref 42–75)
PLATELET # BLD AUTO: 285 THOU/UL (ref 130–400)
POTASSIUM SERPL-SCNC: 3.8 MMOL/L (ref 3.5–5.1)
PREGS CONTROL BACKGROUND?: (no result)
PREGS CONTROL BAR APPEAR?: YES
RBC # BLD AUTO: 4.97 MILL/UL (ref 4.2–5.4)
RBC UR QL AUTO: (no result) HPF (ref 0–3)
SODIUM SERPL-SCNC: 139 MMOL/L (ref 136–145)
SP GR UR STRIP: 1.01 (ref 1–1.04)
WBC # BLD AUTO: 10.4 THOU/UL (ref 4.8–10.8)
WBC UR QL AUTO: (no result) HPF (ref 0–3)

## 2020-12-16 PROCEDURE — 99284 EMERGENCY DEPT VISIT MOD MDM: CPT

## 2020-12-16 PROCEDURE — 87086 URINE CULTURE/COLONY COUNT: CPT

## 2020-12-16 PROCEDURE — 81003 URINALYSIS AUTO W/O SCOPE: CPT

## 2020-12-16 PROCEDURE — 85025 COMPLETE CBC W/AUTO DIFF WBC: CPT

## 2020-12-16 PROCEDURE — 86900 BLOOD TYPING SEROLOGIC ABO: CPT

## 2020-12-16 PROCEDURE — 86901 BLOOD TYPING SEROLOGIC RH(D): CPT

## 2020-12-16 PROCEDURE — 81015 MICROSCOPIC EXAM OF URINE: CPT

## 2020-12-16 PROCEDURE — 80053 COMPREHEN METABOLIC PANEL: CPT

## 2020-12-16 PROCEDURE — 36415 COLL VENOUS BLD VENIPUNCTURE: CPT

## 2020-12-16 PROCEDURE — 84703 CHORIONIC GONADOTROPIN ASSAY: CPT

## 2021-01-05 ENCOUNTER — HOSPITAL ENCOUNTER (EMERGENCY)
Dept: HOSPITAL 92 - ERS | Age: 21
Discharge: HOME | End: 2021-01-05
Payer: COMMERCIAL

## 2021-01-05 DIAGNOSIS — N30.01: Primary | ICD-10-CM

## 2021-01-05 LAB
BACTERIA UR QL AUTO: (no result) HPF
LEUKOCYTE ESTERASE UR QL STRIP.AUTO: 25 LEU/UL
PREGU CONTROL BACKGROUND?: (no result)
PREGU CONTROL BAR APPEAR?: YES
RBC UR QL AUTO: (no result) HPF (ref 0–3)
SP GR UR STRIP: 1.01 (ref 1–1.04)
WBC UR QL AUTO: (no result) HPF (ref 0–3)

## 2021-01-05 PROCEDURE — 81015 MICROSCOPIC EXAM OF URINE: CPT

## 2021-01-05 PROCEDURE — 87086 URINE CULTURE/COLONY COUNT: CPT

## 2021-01-05 PROCEDURE — 81003 URINALYSIS AUTO W/O SCOPE: CPT

## 2021-01-05 PROCEDURE — 96365 THER/PROPH/DIAG IV INF INIT: CPT

## 2021-01-05 PROCEDURE — 96375 TX/PRO/DX INJ NEW DRUG ADDON: CPT

## 2021-01-05 PROCEDURE — 81025 URINE PREGNANCY TEST: CPT

## 2021-01-06 ENCOUNTER — HOSPITAL ENCOUNTER (EMERGENCY)
Dept: HOSPITAL 92 - ERS | Age: 21
Discharge: HOME | End: 2021-01-06
Payer: COMMERCIAL

## 2021-01-06 DIAGNOSIS — R10.9: Primary | ICD-10-CM

## 2021-01-06 LAB
ALBUMIN SERPL BCG-MCNC: 4.4 G/DL (ref 3.5–5)
ALP SERPL-CCNC: 82 U/L (ref 40–110)
ALT SERPL W P-5'-P-CCNC: 13 U/L (ref 8–55)
ANION GAP SERPL CALC-SCNC: 11 MMOL/L (ref 10–20)
AST SERPL-CCNC: 13 U/L (ref 5–34)
BASOPHILS # BLD AUTO: 0 THOU/UL (ref 0–0.2)
BASOPHILS NFR BLD AUTO: 0.4 % (ref 0–1)
BILIRUB SERPL-MCNC: 0.3 MG/DL (ref 0.2–1.2)
BUN SERPL-MCNC: 6 MG/DL (ref 7–18.7)
CALCIUM SERPL-MCNC: 9 MG/DL (ref 7.8–10.44)
CHLORIDE SERPL-SCNC: 106 MMOL/L (ref 98–107)
CO2 SERPL-SCNC: 25 MMOL/L (ref 22–29)
CREAT CL PREDICTED SERPL C-G-VRATE: 0 ML/MIN (ref 70–130)
EOSINOPHIL # BLD AUTO: 0.1 THOU/UL (ref 0–0.7)
EOSINOPHIL NFR BLD AUTO: 1.6 % (ref 0–10)
GLOBULIN SER CALC-MCNC: 3.3 G/DL (ref 2.4–3.5)
GLUCOSE SERPL-MCNC: 82 MG/DL (ref 70–105)
HGB BLD-MCNC: 14.2 G/DL (ref 12–16)
LIPASE SERPL-CCNC: 29 U/L (ref 8–78)
LYMPHOCYTES # BLD: 2.2 THOU/UL (ref 1.2–3.4)
LYMPHOCYTES NFR BLD AUTO: 26.5 % (ref 21–51)
MCH RBC QN AUTO: 28.4 PG (ref 27–31)
MCV RBC AUTO: 88.6 FL (ref 78–98)
MONOCYTES # BLD AUTO: 0.4 THOU/UL (ref 0.11–0.59)
MONOCYTES NFR BLD AUTO: 4.8 % (ref 0–10)
NEUTROPHILS # BLD AUTO: 5.6 THOU/UL (ref 1.4–6.5)
NEUTROPHILS NFR BLD AUTO: 66.7 % (ref 42–75)
PLATELET # BLD AUTO: 269 THOU/UL (ref 130–400)
POTASSIUM SERPL-SCNC: 4.2 MMOL/L (ref 3.5–5.1)
RBC # BLD AUTO: 5 MILL/UL (ref 4.2–5.4)
SODIUM SERPL-SCNC: 138 MMOL/L (ref 136–145)
WBC # BLD AUTO: 8.5 THOU/UL (ref 4.8–10.8)

## 2021-01-06 PROCEDURE — 80053 COMPREHEN METABOLIC PANEL: CPT

## 2021-01-06 PROCEDURE — 74176 CT ABD & PELVIS W/O CONTRAST: CPT

## 2021-01-06 PROCEDURE — 85025 COMPLETE CBC W/AUTO DIFF WBC: CPT

## 2021-01-06 PROCEDURE — 36415 COLL VENOUS BLD VENIPUNCTURE: CPT

## 2021-01-06 PROCEDURE — 83690 ASSAY OF LIPASE: CPT

## 2021-01-06 NOTE — CT
CT ABDOMEN NONCONTRAST

CT PELVIS NONCONTRAST:

(Urolithiasis protocol)



DATE:

1/6/2021



HISTORY:

20-year-old female with right flank pain



TECHNIQUE:

IV injection of iodinated contrast media: None

Oral contrast media: None



FINDINGS:

Other than for urolithiasis, the lack of IV and oral contrast limits the evaluation.



Lung bases: Clear

Gallbladder: Absent. Clip at gallbladder fossa.

Liver: No contour abnormalities.

Spleen: No splenomegaly.

Pancreas: No contour abnormalities.

Adrenals: No mass.

Kidneys: No nephrolithiasis or overt hydronephrosis.

Ureters: No calculi.

Bladder: No calculi.

Abdominal aorta: No aneurysm.

Small bowel: No dilation.

Colon: No adjacent fat stranding.

Appendix: Normal.

Free air: None

Free fluid: None

Osseous structures: Normal



IMPRESSION:

Negative



Reported By: Alok Tafoya 

Electronically Signed:  1/6/2021 9:56 AM

## 2021-03-29 ENCOUNTER — HOSPITAL ENCOUNTER (EMERGENCY)
Dept: HOSPITAL 92 - ERS | Age: 21
Discharge: HOME | End: 2021-03-29
Payer: COMMERCIAL

## 2021-03-29 DIAGNOSIS — O99.281: Primary | ICD-10-CM

## 2021-03-29 DIAGNOSIS — O20.8: ICD-10-CM

## 2021-03-29 DIAGNOSIS — O21.9: ICD-10-CM

## 2021-03-29 DIAGNOSIS — E86.0: ICD-10-CM

## 2021-03-29 DIAGNOSIS — Z3A.01: ICD-10-CM

## 2021-03-29 LAB
ALBUMIN SERPL BCG-MCNC: 4.8 G/DL (ref 3.5–5)
ALP SERPL-CCNC: 66 U/L (ref 40–110)
ALT SERPL W P-5'-P-CCNC: 18 U/L (ref 8–55)
ANION GAP SERPL CALC-SCNC: 18 MMOL/L (ref 10–20)
AST SERPL-CCNC: 41 U/L (ref 5–34)
BACTERIA UR QL AUTO: (no result) HPF
BASOPHILS # BLD AUTO: 0 THOU/UL (ref 0–0.2)
BASOPHILS NFR BLD AUTO: 0.3 % (ref 0–1)
BILIRUB SERPL-MCNC: 0.5 MG/DL (ref 0.2–1.2)
BUN SERPL-MCNC: 6 MG/DL (ref 7–18.7)
CALCIUM SERPL-MCNC: 9.4 MG/DL (ref 7.8–10.44)
CHLORIDE SERPL-SCNC: 104 MMOL/L (ref 98–107)
CO2 SERPL-SCNC: 20 MMOL/L (ref 22–29)
CREAT CL PREDICTED SERPL C-G-VRATE: 0 ML/MIN (ref 70–130)
EOSINOPHIL # BLD AUTO: 0.1 THOU/UL (ref 0–0.7)
EOSINOPHIL NFR BLD AUTO: 1 % (ref 0–10)
GLOBULIN SER CALC-MCNC: 4 G/DL (ref 2.4–3.5)
GLUCOSE SERPL-MCNC: 73 MG/DL (ref 70–105)
HGB BLD-MCNC: 13.5 G/DL (ref 12–16)
LEUKOCYTE ESTERASE UR QL STRIP.AUTO: 250 LEU/UL
LYMPHOCYTES # BLD: 2.9 THOU/UL (ref 1.2–3.4)
LYMPHOCYTES NFR BLD AUTO: 23.3 % (ref 21–51)
MCH RBC QN AUTO: 29.7 PG (ref 27–31)
MCV RBC AUTO: 88.6 FL (ref 78–98)
MONOCYTES # BLD AUTO: 0.7 THOU/UL (ref 0.11–0.59)
MONOCYTES NFR BLD AUTO: 5.4 % (ref 0–10)
NEUTROPHILS # BLD AUTO: 8.8 THOU/UL (ref 1.4–6.5)
NEUTROPHILS NFR BLD AUTO: 70 % (ref 42–75)
PLATELET # BLD AUTO: 244 THOU/UL (ref 130–400)
POTASSIUM SERPL-SCNC: 5.2 MMOL/L (ref 3.5–5.1)
RBC # BLD AUTO: 4.55 MILL/UL (ref 4.2–5.4)
RBC UR QL AUTO: (no result) HPF (ref 0–3)
RBC UR QL AUTO: (no result) HPF (ref 0–3)
SODIUM SERPL-SCNC: 137 MMOL/L (ref 136–145)
SP GR UR STRIP: 1.01 (ref 1–1.04)
SP GR UR STRIP: 1.01 (ref 1–1.04)
WBC # BLD AUTO: 12.6 THOU/UL (ref 4.8–10.8)
WBC UR QL AUTO: (no result) HPF (ref 0–3)
WBC UR QL AUTO: (no result) HPF (ref 0–3)

## 2021-03-29 PROCEDURE — 84702 CHORIONIC GONADOTROPIN TEST: CPT

## 2021-03-29 PROCEDURE — 87086 URINE CULTURE/COLONY COUNT: CPT

## 2021-03-29 PROCEDURE — 80053 COMPREHEN METABOLIC PANEL: CPT

## 2021-03-29 PROCEDURE — 85025 COMPLETE CBC W/AUTO DIFF WBC: CPT

## 2021-03-29 PROCEDURE — 81015 MICROSCOPIC EXAM OF URINE: CPT

## 2021-03-29 PROCEDURE — 96375 TX/PRO/DX INJ NEW DRUG ADDON: CPT

## 2021-03-29 PROCEDURE — 36415 COLL VENOUS BLD VENIPUNCTURE: CPT

## 2021-03-29 PROCEDURE — 96374 THER/PROPH/DIAG INJ IV PUSH: CPT

## 2021-03-29 PROCEDURE — 81003 URINALYSIS AUTO W/O SCOPE: CPT

## 2021-03-29 PROCEDURE — 76856 US EXAM PELVIC COMPLETE: CPT

## 2021-04-23 ENCOUNTER — HOSPITAL ENCOUNTER (OUTPATIENT)
Dept: HOSPITAL 92 - BICULT | Age: 21
Discharge: HOME | End: 2021-04-23
Attending: UROLOGY
Payer: COMMERCIAL

## 2021-04-23 DIAGNOSIS — N13.30: Primary | ICD-10-CM

## 2021-04-23 PROCEDURE — 76770 US EXAM ABDO BACK WALL COMP: CPT

## 2021-06-21 ENCOUNTER — HOSPITAL ENCOUNTER (EMERGENCY)
Dept: HOSPITAL 92 - ERS | Age: 21
Discharge: HOME | End: 2021-06-21
Payer: COMMERCIAL

## 2021-06-21 DIAGNOSIS — Z79.899: ICD-10-CM

## 2021-06-21 DIAGNOSIS — L98.9: ICD-10-CM

## 2021-06-21 DIAGNOSIS — T63.461A: Primary | ICD-10-CM

## 2021-06-21 PROCEDURE — 99281 EMR DPT VST MAYX REQ PHY/QHP: CPT
